# Patient Record
Sex: MALE | Race: WHITE | NOT HISPANIC OR LATINO | Employment: FULL TIME | URBAN - METROPOLITAN AREA
[De-identification: names, ages, dates, MRNs, and addresses within clinical notes are randomized per-mention and may not be internally consistent; named-entity substitution may affect disease eponyms.]

---

## 2017-09-13 ENCOUNTER — RECORDS - HEALTHEAST (OUTPATIENT)
Dept: LAB | Facility: CLINIC | Age: 57
End: 2017-09-13

## 2017-09-14 LAB
CHOLEST SERPL-MCNC: 219 MG/DL
FASTING STATUS PATIENT QL REPORTED: ABNORMAL
HDLC SERPL-MCNC: 45 MG/DL
LDLC SERPL CALC-MCNC: 127 MG/DL
PSA SERPL-MCNC: 0.5 NG/ML (ref 0–3.5)
TRIGL SERPL-MCNC: 236 MG/DL

## 2019-02-22 ENCOUNTER — TRANSFERRED RECORDS (OUTPATIENT)
Dept: HEALTH INFORMATION MANAGEMENT | Facility: CLINIC | Age: 59
End: 2019-02-22

## 2021-07-14 ENCOUNTER — LAB REQUISITION (OUTPATIENT)
Dept: LAB | Facility: CLINIC | Age: 61
End: 2021-07-14
Payer: COMMERCIAL

## 2021-07-14 DIAGNOSIS — Z12.5 ENCOUNTER FOR SCREENING FOR MALIGNANT NEOPLASM OF PROSTATE: ICD-10-CM

## 2021-07-14 DIAGNOSIS — E78.2 MIXED HYPERLIPIDEMIA: ICD-10-CM

## 2021-07-14 PROCEDURE — 80061 LIPID PANEL: CPT | Mod: ORL | Performed by: FAMILY MEDICINE

## 2021-07-14 PROCEDURE — G0103 PSA SCREENING: HCPCS | Mod: ORL | Performed by: FAMILY MEDICINE

## 2021-07-14 PROCEDURE — 80053 COMPREHEN METABOLIC PANEL: CPT | Mod: ORL | Performed by: FAMILY MEDICINE

## 2021-07-15 LAB
ALBUMIN SERPL-MCNC: 4 G/DL (ref 3.5–5)
ALP SERPL-CCNC: 73 U/L (ref 45–120)
ALT SERPL W P-5'-P-CCNC: 26 U/L (ref 0–45)
ANION GAP SERPL CALCULATED.3IONS-SCNC: 13 MMOL/L (ref 5–18)
AST SERPL W P-5'-P-CCNC: 24 U/L (ref 0–40)
BILIRUB SERPL-MCNC: 0.7 MG/DL (ref 0–1)
BUN SERPL-MCNC: 16 MG/DL (ref 8–22)
CALCIUM SERPL-MCNC: 9.1 MG/DL (ref 8.5–10.5)
CHLORIDE BLD-SCNC: 104 MMOL/L (ref 98–107)
CHOLEST SERPL-MCNC: 208 MG/DL
CO2 SERPL-SCNC: 23 MMOL/L (ref 22–31)
CREAT SERPL-MCNC: 0.88 MG/DL (ref 0.7–1.3)
FASTING STATUS PATIENT QL REPORTED: ABNORMAL
GFR SERPL CREATININE-BSD FRML MDRD: >90 ML/MIN/1.73M2
GLUCOSE BLD-MCNC: 102 MG/DL (ref 70–125)
HDLC SERPL-MCNC: 47 MG/DL
LDLC SERPL CALC-MCNC: 140 MG/DL
POTASSIUM BLD-SCNC: 4.2 MMOL/L (ref 3.5–5)
PROT SERPL-MCNC: 6.8 G/DL (ref 6–8)
PSA SERPL-MCNC: 0.52 UG/L (ref 0–4.5)
SODIUM SERPL-SCNC: 140 MMOL/L (ref 136–145)
TRIGL SERPL-MCNC: 103 MG/DL

## 2021-08-10 ENCOUNTER — LAB REQUISITION (OUTPATIENT)
Dept: LAB | Facility: CLINIC | Age: 61
End: 2021-08-10
Payer: COMMERCIAL

## 2021-08-10 DIAGNOSIS — I48.0 PAROXYSMAL ATRIAL FIBRILLATION (H): ICD-10-CM

## 2021-08-10 PROCEDURE — 84443 ASSAY THYROID STIM HORMONE: CPT | Performed by: FAMILY MEDICINE

## 2021-08-10 PROCEDURE — 36415 COLL VENOUS BLD VENIPUNCTURE: CPT | Performed by: FAMILY MEDICINE

## 2021-08-11 LAB — TSH SERPL DL<=0.005 MIU/L-ACNC: 1.79 UIU/ML (ref 0.3–5)

## 2022-03-16 ENCOUNTER — TRANSFERRED RECORDS (OUTPATIENT)
Dept: HEALTH INFORMATION MANAGEMENT | Facility: CLINIC | Age: 62
End: 2022-03-16

## 2022-07-18 ENCOUNTER — LAB REQUISITION (OUTPATIENT)
Dept: LAB | Facility: CLINIC | Age: 62
End: 2022-07-18
Payer: COMMERCIAL

## 2022-07-18 DIAGNOSIS — Z12.5 ENCOUNTER FOR SCREENING FOR MALIGNANT NEOPLASM OF PROSTATE: ICD-10-CM

## 2022-07-18 DIAGNOSIS — E78.2 MIXED HYPERLIPIDEMIA: ICD-10-CM

## 2022-07-18 PROCEDURE — 80061 LIPID PANEL: CPT | Mod: ORL | Performed by: FAMILY MEDICINE

## 2022-07-18 PROCEDURE — 80053 COMPREHEN METABOLIC PANEL: CPT | Mod: ORL | Performed by: FAMILY MEDICINE

## 2022-07-18 PROCEDURE — G0103 PSA SCREENING: HCPCS | Mod: ORL | Performed by: FAMILY MEDICINE

## 2022-07-19 LAB
ALBUMIN SERPL BCG-MCNC: 4.2 G/DL (ref 3.5–5.2)
ALP SERPL-CCNC: 76 U/L (ref 40–129)
ALT SERPL W P-5'-P-CCNC: 30 U/L (ref 10–50)
ANION GAP SERPL CALCULATED.3IONS-SCNC: 13 MMOL/L (ref 7–15)
AST SERPL W P-5'-P-CCNC: 32 U/L (ref 10–50)
BILIRUB SERPL-MCNC: 0.4 MG/DL
BUN SERPL-MCNC: 13.2 MG/DL (ref 8–23)
CALCIUM SERPL-MCNC: 8.8 MG/DL (ref 8.8–10.2)
CHLORIDE SERPL-SCNC: 100 MMOL/L (ref 98–107)
CHOLEST SERPL-MCNC: 228 MG/DL
CREAT SERPL-MCNC: 0.94 MG/DL (ref 0.67–1.17)
DEPRECATED HCO3 PLAS-SCNC: 24 MMOL/L (ref 22–29)
GFR SERPL CREATININE-BSD FRML MDRD: >90 ML/MIN/1.73M2
GLUCOSE SERPL-MCNC: 93 MG/DL (ref 70–99)
HDLC SERPL-MCNC: 40 MG/DL
LDLC SERPL CALC-MCNC: 134 MG/DL
NONHDLC SERPL-MCNC: 188 MG/DL
POTASSIUM SERPL-SCNC: 4.2 MMOL/L (ref 3.4–5.3)
PROT SERPL-MCNC: 6.7 G/DL (ref 6.4–8.3)
PSA SERPL-MCNC: 0.54 NG/ML (ref 0–4.5)
SODIUM SERPL-SCNC: 137 MMOL/L (ref 136–145)
TRIGL SERPL-MCNC: 270 MG/DL

## 2023-01-19 ENCOUNTER — TRANSFERRED RECORDS (OUTPATIENT)
Dept: HEALTH INFORMATION MANAGEMENT | Facility: CLINIC | Age: 63
End: 2023-01-19

## 2023-09-26 ENCOUNTER — OFFICE VISIT (OUTPATIENT)
Dept: FAMILY MEDICINE | Facility: CLINIC | Age: 63
End: 2023-09-26
Payer: COMMERCIAL

## 2023-09-26 VITALS
BODY MASS INDEX: 32.98 KG/M2 | WEIGHT: 257 LBS | HEIGHT: 74 IN | TEMPERATURE: 98.4 F | SYSTOLIC BLOOD PRESSURE: 109 MMHG | DIASTOLIC BLOOD PRESSURE: 71 MMHG | HEART RATE: 65 BPM | OXYGEN SATURATION: 96 % | RESPIRATION RATE: 16 BRPM

## 2023-09-26 DIAGNOSIS — Z13.0 SCREENING, ANEMIA, DEFICIENCY, IRON: ICD-10-CM

## 2023-09-26 DIAGNOSIS — Z12.5 SCREENING FOR PROSTATE CANCER: ICD-10-CM

## 2023-09-26 DIAGNOSIS — Z11.4 SCREENING FOR HIV WITHOUT PRESENCE OF RISK FACTORS: ICD-10-CM

## 2023-09-26 DIAGNOSIS — Z11.4 SCREENING FOR HIV (HUMAN IMMUNODEFICIENCY VIRUS): ICD-10-CM

## 2023-09-26 DIAGNOSIS — E66.811 CLASS 1 OBESITY WITH SERIOUS COMORBIDITY AND BODY MASS INDEX (BMI) OF 33.0 TO 33.9 IN ADULT, UNSPECIFIED OBESITY TYPE: ICD-10-CM

## 2023-09-26 DIAGNOSIS — G47.30 SLEEP APNEA, UNSPECIFIED TYPE: ICD-10-CM

## 2023-09-26 DIAGNOSIS — Z12.11 SCREEN FOR COLON CANCER: Primary | ICD-10-CM

## 2023-09-26 DIAGNOSIS — E78.5 HYPERLIPIDEMIA, UNSPECIFIED HYPERLIPIDEMIA TYPE: ICD-10-CM

## 2023-09-26 DIAGNOSIS — H91.90 DECREASED HEARING, UNSPECIFIED LATERALITY: ICD-10-CM

## 2023-09-26 DIAGNOSIS — R06.00 PND (PAROXYSMAL NOCTURNAL DYSPNEA): ICD-10-CM

## 2023-09-26 DIAGNOSIS — R13.10 DYSPHAGIA, UNSPECIFIED TYPE: ICD-10-CM

## 2023-09-26 DIAGNOSIS — Z00.00 ENCOUNTER FOR PREVENTIVE CARE: ICD-10-CM

## 2023-09-26 DIAGNOSIS — Z11.59 ENCOUNTER FOR HEPATITIS C SCREENING TEST FOR LOW RISK PATIENT: ICD-10-CM

## 2023-09-26 DIAGNOSIS — Z11.59 NEED FOR HEPATITIS C SCREENING TEST: ICD-10-CM

## 2023-09-26 DIAGNOSIS — Z13.228 SCREENING FOR METABOLIC DISORDER: ICD-10-CM

## 2023-09-26 DIAGNOSIS — R31.9 HEMATURIA, UNSPECIFIED TYPE: ICD-10-CM

## 2023-09-26 PROBLEM — I48.0 PAROXYSMAL ATRIAL FIBRILLATION (H): Status: ACTIVE | Noted: 2021-08-01

## 2023-09-26 PROBLEM — R29.818 SUSPECTED SLEEP APNEA: Status: ACTIVE | Noted: 2021-08-03

## 2023-09-26 PROCEDURE — 99214 OFFICE O/P EST MOD 30 MIN: CPT | Mod: 25 | Performed by: FAMILY MEDICINE

## 2023-09-26 PROCEDURE — 99386 PREV VISIT NEW AGE 40-64: CPT | Performed by: FAMILY MEDICINE

## 2023-09-26 RX ORDER — FLUTICASONE PROPIONATE 50 MCG
1 SPRAY, SUSPENSION (ML) NASAL
COMMUNITY
End: 2023-09-26

## 2023-09-26 RX ORDER — FLUTICASONE PROPIONATE 50 MCG
1 SPRAY, SUSPENSION (ML) NASAL DAILY
Qty: 16 G | Refills: 11 | Status: SHIPPED | OUTPATIENT
Start: 2023-09-26

## 2023-09-26 ASSESSMENT — ENCOUNTER SYMPTOMS
CONSTIPATION: 0
SHORTNESS OF BREATH: 0
NAUSEA: 0
NERVOUS/ANXIOUS: 0
DIARRHEA: 0
PALPITATIONS: 0
PARESTHESIAS: 0
MYALGIAS: 0
ARTHRALGIAS: 0
ABDOMINAL PAIN: 0
DIZZINESS: 0
HEADACHES: 0
EYE PAIN: 0
SORE THROAT: 0
CHILLS: 0
HEMATURIA: 1
COUGH: 0
FEVER: 0
DYSURIA: 0
JOINT SWELLING: 0
WEAKNESS: 0
HEMATOCHEZIA: 0
FREQUENCY: 0
HEARTBURN: 0

## 2023-09-26 NOTE — ASSESSMENT & PLAN NOTE
Obesity with bmi 33.45. with chronic comorbid condition including sleep apnea, afib, and hyperlipidemia, weight reduction strongly recommended. He declined physician assisted weight loss that was offered today.

## 2023-09-26 NOTE — ASSESSMENT & PLAN NOTE
Hyperlipidemia - he was told he is borderline with elevated lipids. Recommend recheck. He has been recommended to take statin, but he did coronary calcium score and score was zero and so he never took statin. Coronary calcium score was zero fall 2022.

## 2023-09-26 NOTE — ASSESSMENT & PLAN NOTE
Recommended vaccines - flu shot and zoster vaccine  Psa discussed he declined.   Colonoscopy: he says he did have colonoscopy in the past but he thinks it is not due for a few more years.  Will obtain records.   Std testing desired: offered  Osteoporosis prevention discussed.  vitamin d levels ordered. Recommend daily calcium and vitamin d intake to keep good bone health. Recommend weight bearing exercise, no tobacco, and limit alcohol  dexa - no indication.  Recommend sunscreen, exercise, & healthy diet.  Offered cbc, cmp, lipids and asked what other testing he  desires today  I have had an Advance Directives discussion with the patient.   Body mass index is 33.45 kg/m .   [facility] email sent.

## 2023-09-26 NOTE — ASSESSMENT & PLAN NOTE
Hx dysphagia, has had esophageal stretching 3 times. He says it was stretched most recently earlier in 2023, but he doesn't know exactly when, but it didn't help.     Solid dysphagia, gi referral placed. Also will try and obtain records from previous pcp.

## 2023-09-26 NOTE — PROGRESS NOTES
Flonase, dysphagia etc. addressed above and beyond usual scope of annual exam.     SUBJECTIVE:   CC: Bhavesh is an 62 year old who presents for preventative health visit.       9/26/2023     2:17 PM   Additional Questions   Roomed by ac   Accompanied by wife - Meron       Healthy Habits:     Getting at least 3 servings of Calcium per day:  Yes    Bi-annual eye exam:  Yes    Dental care twice a year:  Yes    Sleep apnea or symptoms of sleep apnea:  Daytime drowsiness and Sleep apnea    Diet:  Regular (no restrictions)    Frequency of exercise:  2-3 days/week    Duration of exercise:  30-45 minutes    Taking medications regularly:  Not Applicable    Medication side effects:  Not applicable    Additional concerns today:  No      Today's PHQ-2 Score:       9/26/2023     2:21 PM   PHQ-2 ( 1999 [employer])   Q1: Little interest or pleasure in doing things 0   Q2: Feeling down, depressed or hopeless 0   PHQ-2 Score 0   Q1: Little interest or pleasure in doing things Not at all   Q2: Feeling down, depressed or hopeless Not at all   PHQ-2 Score 0     Have you ever done Advance Care Planning? (For example, a Health Directive, POLST, or a discussion with a medical provider or your loved ones about your wishes): No, advance care planning information given to patient to review.  Patient plans to discuss their wishes with loved ones or provider.      Social History     Tobacco Use    Smoking status: Never    Smokeless tobacco: Never   Substance Use Topics    Alcohol use: Not on file           9/26/2023     2:18 PM   Alcohol Use   Prescreen: >3 drinks/day or >7 drinks/week? No       Last PSA:   Prostate Specific Antigen Screen   Date Value Ref Range Status   07/18/2022 0.54 0.00 - 4.50 ng/mL Final   07/14/2021 0.52 0.00 - 4.50 ug/L Final     Comment:     Blood specimen source: Peripheral Blood       Reviewed orders with patient. Reviewed health maintenance and updated orders accordingly - Yes      Reviewed and updated as needed this  "visit by clinical staff   Tobacco  Allergies  Meds  Problems  Med Hx  Surg Hx  Fam Hx  Soc   Hx        Reviewed and updated as needed this visit by Provider   Tobacco  Allergies  Meds  Problems  Med Hx  Surg Hx  Fam Hx  Soc   Hx           Review of Systems   Constitutional:  Negative for chills and fever.   HENT:  Positive for hearing loss. Negative for congestion, ear pain and sore throat.    Eyes:  Negative for pain and visual disturbance.   Respiratory:  Negative for cough and shortness of breath.    Cardiovascular:  Negative for chest pain, palpitations and peripheral edema.   Gastrointestinal:  Negative for abdominal pain, constipation, diarrhea, heartburn, hematochezia and nausea.   Genitourinary:  Positive for hematuria. Negative for dysuria, frequency, genital sores, impotence, penile discharge and urgency.   Musculoskeletal:  Negative for arthralgias, joint swelling and myalgias.   Skin:  Negative for rash.   Neurological:  Negative for dizziness, weakness, headaches and paresthesias.   Psychiatric/Behavioral:  Negative for mood changes. The patient is not nervous/anxious.          OBJECTIVE:   /71 (BP Location: Left arm, Patient Position: Sitting, Cuff Size: Adult Large)   Pulse 65   Temp 98.4  F (36.9  C) (Oral)   Resp 16   Ht 1.867 m (6' 1.5\")   Wt 116.6 kg (257 lb)   SpO2 96%   BMI 33.45 kg/m      Physical Exam  Constitutional:       Appearance: Normal appearance.   HENT:      Head: Normocephalic and atraumatic.      Right Ear: Tympanic membrane, ear canal and external ear normal.      Left Ear: There is impacted cerumen.      Mouth/Throat:      Mouth: Mucous membranes are moist.      Pharynx: Oropharynx is clear.   Eyes:      Conjunctiva/sclera: Conjunctivae normal.      Pupils: Pupils are equal, round, and reactive to light.   Cardiovascular:      Rate and Rhythm: Normal rate and regular rhythm.      Heart sounds: Normal heart sounds.   Pulmonary:      Effort: Pulmonary " effort is normal.      Breath sounds: Normal breath sounds.   Abdominal:      General: Bowel sounds are normal.      Palpations: Abdomen is soft.   Musculoskeletal:         General: Normal range of motion.      Cervical back: Normal range of motion and neck supple.   Neurological:      General: No focal deficit present.      Mental Status: He is alert.   Psychiatric:         Mood and Affect: Mood normal.         Behavior: Behavior normal.         Thought Content: Thought content normal.         Judgment: Judgment normal.               ASSESSMENT/PLAN:     Problem List Items Addressed This Visit          Nervous and Auditory    Diminished hearing     Diminished hearing noted on ros, audiology referral placed.          Relevant Orders    Adult Audiology  Referral       Respiratory    Sleep apnea     Sleep apnea diagnosed fall 2022 diagnosed as probable cause of afib which was discovered 2021. He was given a cpap which he couldn't sleep with, so now he is trying to get a dental appliance through an organization called [employer].     He doesn't want any intervention from me on this at this time.          PND (paroxysmal nocturnal dyspnea)     Post nasal drainage, flonase helps and he uses that every morning.          Relevant Medications    fluticasone (FLONASE) 50 MCG/ACT nasal spray       Digestive    Dysphagia, unspecified type     Hx dysphagia, has had esophageal stretching 3 times. He says it was stretched most recently earlier in 2023, but he doesn't know exactly when, but it didn't help.     Solid dysphagia, gi referral placed. Also will try and obtain records from previous pcp.          Relevant Orders    Adult GI  Referral - Consult Only    Class 1 obesity with serious comorbidity and body mass index (BMI) of 33.0 to 33.9 in adult     Obesity with bmi 33.45. with chronic comorbid condition including sleep apnea, afib, and hyperlipidemia, weight reduction strongly recommended. He declined  physician assisted weight loss that was offered today.             Endocrine    Hyperlipidemia     Hyperlipidemia - he was told he is borderline with elevated lipids. Recommend recheck. He has been recommended to take statin, but he did coronary calcium score and score was zero and so he never took statin. Coronary calcium score was zero fall 2022.          Relevant Orders    Lipid Profile       Other    Encounter for preventive care     Recommended vaccines - flu shot and zoster vaccine  Psa discussed he declined.   Colonoscopy: he says he did have colonoscopy in the past but he thinks it is not due for a few more years.  Will obtain records.   Std testing desired: offered  Osteoporosis prevention discussed.  vitamin d levels ordered. Recommend daily calcium and vitamin d intake to keep good bone health. Recommend weight bearing exercise, no tobacco, and limit alcohol  dexa - no indication.  Recommend sunscreen, exercise, & healthy diet.  Offered cbc, cmp, lipids and asked what other testing he  desires today  I have had an Advance Directives discussion with the patient.   Body mass index is 33.45 kg/m .   [facility] email sent.         Hematuria     Hematuria noted on ros, will check ua/ uc.         Relevant Orders    UA Macroscopic with reflex to Microscopic and Culture - Lab Collect    Urine Culture Aerobic Bacterial - lab collect     Other Visit Diagnoses       Screen for colon cancer    -  Primary    Screening for HIV (human immunodeficiency virus)        Relevant Orders    HIV Antigen Antibody Combo    Need for hepatitis C screening test        Relevant Orders    Hepatitis C Screen Reflex to HCV RNA Quant and Genotype    Screening for prostate cancer        Relevant Orders    PSA, screen    Screening, anemia, deficiency, iron        Relevant Orders    CBC with platelets    Screening for metabolic disorder        Relevant Orders    Comprehensive metabolic panel (BMP + Alb, Alk Phos, ALT, AST, Total. Bili, TP)     "Screening for HIV without presence of risk factors        Relevant Orders    HIV Antigen Antibody Combo    Encounter for hepatitis C screening test for low risk patient        Relevant Orders    Hepatitis C Screen Reflex to HCV RNA Quant and Genotype            Patient has been advised of split billing requirements and indicates understanding: Yes      COUNSELING:   Reviewed preventive health counseling, as reflected in patient instructions       Regular exercise       Healthy diet/nutrition       Safe sex practices/STD prevention       Colorectal cancer screening      BMI:   Estimated body mass index is 33.45 kg/m  as calculated from the following:    Height as of this encounter: 1.867 m (6' 1.5\").    Weight as of this encounter: 116.6 kg (257 lb).   Weight management plan: Discussed healthy diet and exercise guidelines      He reports that he has never smoked. He has never used smokeless tobacco.            Wendy Banks MD  [facility]  "

## 2023-09-26 NOTE — ASSESSMENT & PLAN NOTE
Sleep apnea diagnosed fall 2022 diagnosed as probable cause of afib which was discovered 2021. He was given a cpap which he couldn't sleep with, so now he is trying to get a dental appliance through an organization called [employer].     He doesn't want any intervention from me on this at this time.

## 2023-09-27 ENCOUNTER — DOCUMENTATION ONLY (OUTPATIENT)
Dept: GASTROENTEROLOGY | Facility: CLINIC | Age: 63
End: 2023-09-27
Payer: COMMERCIAL

## 2023-09-27 NOTE — PROGRESS NOTES
Faxed request to [facility]: please send medical records pertaining to recent referral.      Clinic Information:  [facility]  Phone #: [phone] extension [phone] sk

## 2023-09-29 NOTE — PROGRESS NOTES
Called [facility] to follow-up on recent faxed request for records on 9-28-23. [facility] representative denied receipt and confirmed fax number. Re-faxed records request.    Call today (9-29-23) and confirmed receipt, was informed that records would be sent in a timely manner.     Clinic Information:  [facility]  Phone #: [phone] extension [phone] sk

## 2023-12-01 ENCOUNTER — LAB (OUTPATIENT)
Dept: LAB | Facility: CLINIC | Age: 63
End: 2023-12-01
Payer: COMMERCIAL

## 2023-12-01 ENCOUNTER — ALLIED HEALTH/NURSE VISIT (OUTPATIENT)
Dept: FAMILY MEDICINE | Facility: CLINIC | Age: 63
End: 2023-12-01
Payer: COMMERCIAL

## 2023-12-01 DIAGNOSIS — R31.9 HEMATURIA, UNSPECIFIED TYPE: ICD-10-CM

## 2023-12-01 DIAGNOSIS — Z11.59 ENCOUNTER FOR HEPATITIS C SCREENING TEST FOR LOW RISK PATIENT: ICD-10-CM

## 2023-12-01 DIAGNOSIS — Z11.59 NEED FOR HEPATITIS C SCREENING TEST: ICD-10-CM

## 2023-12-01 DIAGNOSIS — E78.5 HYPERLIPIDEMIA, UNSPECIFIED HYPERLIPIDEMIA TYPE: ICD-10-CM

## 2023-12-01 DIAGNOSIS — Z13.0 SCREENING, ANEMIA, DEFICIENCY, IRON: ICD-10-CM

## 2023-12-01 DIAGNOSIS — Z11.4 SCREENING FOR HIV (HUMAN IMMUNODEFICIENCY VIRUS): ICD-10-CM

## 2023-12-01 DIAGNOSIS — Z13.228 SCREENING FOR METABOLIC DISORDER: ICD-10-CM

## 2023-12-01 DIAGNOSIS — Z12.5 SCREENING FOR PROSTATE CANCER: ICD-10-CM

## 2023-12-01 DIAGNOSIS — Z11.4 SCREENING FOR HIV WITHOUT PRESENCE OF RISK FACTORS: ICD-10-CM

## 2023-12-01 DIAGNOSIS — Z23 NEED FOR VACCINATION: Primary | ICD-10-CM

## 2023-12-01 LAB
ALBUMIN SERPL BCG-MCNC: 4.2 G/DL (ref 3.5–5.2)
ALBUMIN UR-MCNC: NEGATIVE MG/DL
ALP SERPL-CCNC: 80 U/L (ref 40–150)
ALT SERPL W P-5'-P-CCNC: 32 U/L (ref 0–70)
ANION GAP SERPL CALCULATED.3IONS-SCNC: 10 MMOL/L (ref 7–15)
APPEARANCE UR: CLEAR
AST SERPL W P-5'-P-CCNC: 31 U/L (ref 0–45)
BILIRUB SERPL-MCNC: 0.6 MG/DL
BILIRUB UR QL STRIP: NEGATIVE
BUN SERPL-MCNC: 15.7 MG/DL (ref 8–23)
CALCIUM SERPL-MCNC: 9 MG/DL (ref 8.8–10.2)
CHLORIDE SERPL-SCNC: 102 MMOL/L (ref 98–107)
COLOR UR AUTO: YELLOW
CREAT SERPL-MCNC: 0.99 MG/DL (ref 0.67–1.17)
DEPRECATED HCO3 PLAS-SCNC: 27 MMOL/L (ref 22–29)
EGFRCR SERPLBLD CKD-EPI 2021: 86 ML/MIN/1.73M2
ERYTHROCYTE [DISTWIDTH] IN BLOOD BY AUTOMATED COUNT: 12.4 % (ref 10–15)
GLUCOSE SERPL-MCNC: 97 MG/DL (ref 70–99)
GLUCOSE UR STRIP-MCNC: NEGATIVE MG/DL
HCT VFR BLD AUTO: 45.3 % (ref 40–53)
HGB BLD-MCNC: 15.4 G/DL (ref 13.3–17.7)
HGB UR QL STRIP: ABNORMAL
KETONES UR STRIP-MCNC: NEGATIVE MG/DL
LEUKOCYTE ESTERASE UR QL STRIP: NEGATIVE
MCH RBC QN AUTO: 30.1 PG (ref 26.5–33)
MCHC RBC AUTO-ENTMCNC: 34 G/DL (ref 31.5–36.5)
MCV RBC AUTO: 89 FL (ref 78–100)
NITRATE UR QL: NEGATIVE
PH UR STRIP: 5.5 [PH] (ref 5–8)
PLATELET # BLD AUTO: 222 10E3/UL (ref 150–450)
POTASSIUM SERPL-SCNC: 4.3 MMOL/L (ref 3.4–5.3)
PROT SERPL-MCNC: 7.2 G/DL (ref 6.4–8.3)
RBC # BLD AUTO: 5.12 10E6/UL (ref 4.4–5.9)
RBC #/AREA URNS AUTO: NORMAL /HPF
SODIUM SERPL-SCNC: 139 MMOL/L (ref 135–145)
SP GR UR STRIP: 1.02 (ref 1–1.03)
UROBILINOGEN UR STRIP-ACNC: 0.2 E.U./DL
WBC # BLD AUTO: 4.4 10E3/UL (ref 4–11)
WBC #/AREA URNS AUTO: NORMAL /HPF

## 2023-12-01 PROCEDURE — G0103 PSA SCREENING: HCPCS

## 2023-12-01 PROCEDURE — 81001 URINALYSIS AUTO W/SCOPE: CPT

## 2023-12-01 PROCEDURE — 90686 IIV4 VACC NO PRSV 0.5 ML IM: CPT

## 2023-12-01 PROCEDURE — 36415 COLL VENOUS BLD VENIPUNCTURE: CPT

## 2023-12-01 PROCEDURE — 86803 HEPATITIS C AB TEST: CPT

## 2023-12-01 PROCEDURE — 90471 IMMUNIZATION ADMIN: CPT

## 2023-12-01 PROCEDURE — 87086 URINE CULTURE/COLONY COUNT: CPT

## 2023-12-01 PROCEDURE — 87389 HIV-1 AG W/HIV-1&-2 AB AG IA: CPT

## 2023-12-01 PROCEDURE — 85027 COMPLETE CBC AUTOMATED: CPT

## 2023-12-01 PROCEDURE — 90480 ADMN SARSCOV2 VAC 1/ONLY CMP: CPT

## 2023-12-01 PROCEDURE — 91320 SARSCV2 VAC 30MCG TRS-SUC IM: CPT

## 2023-12-01 PROCEDURE — 80061 LIPID PANEL: CPT

## 2023-12-01 PROCEDURE — 99207 PR NO CHARGE NURSE ONLY: CPT

## 2023-12-01 PROCEDURE — 80053 COMPREHEN METABOLIC PANEL: CPT

## 2023-12-02 LAB
BACTERIA UR CULT: NO GROWTH
CHOLEST SERPL-MCNC: 218 MG/DL
HCV AB SERPL QL IA: NONREACTIVE
HDLC SERPL-MCNC: 48 MG/DL
HIV 1+2 AB+HIV1 P24 AG SERPL QL IA: NONREACTIVE
LDLC SERPL CALC-MCNC: 152 MG/DL
NONHDLC SERPL-MCNC: 170 MG/DL
PSA SERPL DL<=0.01 NG/ML-MCNC: 0.52 NG/ML (ref 0–4.5)
TRIGL SERPL-MCNC: 92 MG/DL

## 2023-12-12 ENCOUNTER — OFFICE VISIT (OUTPATIENT)
Dept: AUDIOLOGY | Facility: CLINIC | Age: 63
End: 2023-12-12
Payer: COMMERCIAL

## 2023-12-12 DIAGNOSIS — H90.A21 SENSORINEURAL HEARING LOSS (SNHL) OF RIGHT EAR WITH RESTRICTED HEARING OF LEFT EAR: Primary | ICD-10-CM

## 2023-12-12 DIAGNOSIS — H93.13 TINNITUS OF BOTH EARS: ICD-10-CM

## 2023-12-12 PROCEDURE — 92550 TYMPANOMETRY & REFLEX THRESH: CPT | Mod: 52 | Performed by: AUDIOLOGIST

## 2023-12-12 PROCEDURE — 92557 COMPREHENSIVE HEARING TEST: CPT | Performed by: AUDIOLOGIST

## 2023-12-12 NOTE — PROGRESS NOTES
AUDIOLOGY REPORT    SUBJECTIVE:  Bhavesh Davis is a 63 year old male who was seen in the Audiology Clinic at the [facility] for audiologic evaluation, referred by Wendy Banks M.D. The patient reported decreased hearing ability for conversations with family members, and especially with his grandchildren. He endorsed bilateral, constant tinnitus, which increases in volume occasionally, but is not pulsatile or debilitating. 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 No hearing protection has been used. He denied vertigo, otalgia, otorrhea, recent illness, or aural fullness.     OBJECTIVE:  Abuse Screening:  Do you feel unsafe at home or work/school? No  Do you feel threatened by someone? No  Does anyone try to keep you from having contact with others, or doing things outside of your home? No  Physical signs of abuse present? No     Fall Risk Screen:  1. Have you fallen two or more times in the past year? No  2. Have you fallen and had an injury in the past year? No    Is patient a fall risk? No  Referral initiated: No  Fall Risk Assessment Completed by Audiology    Otoscopic exam indicates ears are clear of cerumen, bilaterally.     Pure Tone Thresholds assessed using conventional audiometry with good  reliability from 250-8000 Hz bilaterally using insert earphones and circumaural headphones.     RIGHT:  Normal hearing sensitivity for 250-2000 Hz, steeply sloping to severe to profound sensorineural hearing loss for 0615-8833 Hz.    LEFT:    Normal hearing sensitivity for 250-2000 Hz, steeply sloping to moderate severe/severe sensorineural hearing loss for 9604-2487 Hz.    Tympanogram:    RIGHT: normal eardrum mobility    LEFT:   normal eardrum mobility    Reflexes for 1000 Hz (reported by stimulus ear):  RIGHT: Ipsilateral was unable to be tested (could not maintain  seal)  RIGHT: Contralateral is present at normal levels  LEFT:   Ipsilateral is present at normal levels  LEFT:   Contralateral was unable to be tested (could not maintain seal)      Speech Reception Threshold:    RIGHT: 15 dB HL    LEFT:   20 dB HL  Word Recognition Score:     RIGHT: 100% at 55 dB HL using NU-6 recorded word list.    LEFT:   100% at 55 dB HL using NU-6 recorded word list.      ASSESSMENT:     ICD-10-CM    1. Sensorineural hearing loss (SNHL) of right ear with restricted hearing of left ear  H90.A21 Adult Audiology  Referral      2. Tinnitus of both ears  H93.13           Today s results were discussed with the patient in detail. Appropriate communication strategies were discussed at length, as were reasonable expectations regarding hearing at a distance, in noisy environments, or when attention is diverted elsewhere.    PLAN:  Patient was counseled regarding hearing loss and impact on communication.  Patient is a good candidate for binaural amplification at this time. Mr. Davis was encouraged to check on his amplification benefits with his health insurance carrier, to determine his financial responsibility as well as where those benefits may be used. Separately, custom hearing protection for use with firearms/impulse noise with a passive filter was discussed for use while hunting to preserve the viable hearing remaining, and to prevent worsening of his tinnitus. He was provided with a copy of today's audiogram, and information on the [facility] hearing aid program.    Mr. Davis should return annually for retesting to monitor current hearing thresholds. Wear hearing protection consistently in noise to preserve residual hearing sensitivity and to minimize the effects of noise on tinnitus. Mr. Davis expressed verbal understanding of this information and plan. Please call this clinic with questions regarding these results or recommendations.        Timoteo Suarez,  CCC-A  [place] Licensed Audiologist [id number]

## 2024-02-01 ENCOUNTER — TELEPHONE (OUTPATIENT)
Dept: GASTROENTEROLOGY | Facility: CLINIC | Age: 64
End: 2024-02-01
Payer: COMMERCIAL

## 2024-02-01 NOTE — TELEPHONE ENCOUNTER
Called to remind patient of their upcoming appointment with our GI clinic, on Mond Feb 5th at 3:40pm with Dr. Rick Sanz. This appointment is scheduled as an in-person appt. Please arrive 15 minutes early to check in for your appointment. , if your appointment is virtual (video or telephone) you need to be in [place] for the visit. To reschedule or cancel patient to call [phone].    Janice Almodovar

## 2024-02-02 NOTE — TELEPHONE ENCOUNTER
REFERRAL INFORMATION:  Referring Provider:  Wendy Banks MD  Referring Clinic:  [facility]  Reason for Visit/Diagnosis: R13.10 (ICD-10-CM) - Dysphagia, unspecified type     FUTURE VISIT INFORMATION:  Appointment Date: 2/5/24  Appointment Time: 3:40 PM      NOTES STATUS DETAILS   OFFICE NOTE from Referring Provider Internal 9/26/23 - PCC OV with Wendy Banks MD    OFFICE NOTE from Other Specialist Received 10/19/22 - PCC OV with Brock Hart MD at [facility]     1/20/23 - GI result note with Aditya Smith   3/16/22 - GI result note with Anthony Angeles DO at [facility]     2/25/19 - GI result note with Chinedu David MD at [facility]    MEDICATION LIST Internal         ENDOSCOPY  Received [facility]:  1/19/23 - EGD   COLONOSCOPY Received [facility]:  3/16/22, 2/22/19   PERTINENT LABS Internal 12/1/23 - CMP; CBC/diff   PATHOLOGY REPORTS (RELATED) Received 1/19/23 - EGD bx   3/16/22, 2/22/19 - colon bx

## 2024-02-05 ENCOUNTER — OFFICE VISIT (OUTPATIENT)
Dept: GASTROENTEROLOGY | Facility: CLINIC | Age: 64
End: 2024-02-05
Payer: COMMERCIAL

## 2024-02-05 ENCOUNTER — PRE VISIT (OUTPATIENT)
Dept: GASTROENTEROLOGY | Facility: CLINIC | Age: 64
End: 2024-02-05

## 2024-02-05 VITALS
WEIGHT: 269.1 LBS | DIASTOLIC BLOOD PRESSURE: 67 MMHG | HEART RATE: 63 BPM | HEIGHT: 75 IN | SYSTOLIC BLOOD PRESSURE: 112 MMHG | OXYGEN SATURATION: 96 % | BODY MASS INDEX: 33.46 KG/M2

## 2024-02-05 DIAGNOSIS — R13.19 ESOPHAGEAL DYSPHAGIA: Primary | ICD-10-CM

## 2024-02-05 PROCEDURE — 99204 OFFICE O/P NEW MOD 45 MIN: CPT | Performed by: INTERNAL MEDICINE

## 2024-02-05 ASSESSMENT — PAIN SCALES - GENERAL: PAINLEVEL: NO PAIN (0)

## 2024-02-05 NOTE — PATIENT INSTRUCTIONS
It was a pleasure taking care of you today.  I've included a brief summary of our discussion and care plan from today's visit below.  Please review this information with your primary care provider.  _______________________________________________________________________    My recommendations are summarized as follows:    Please schedule an upper endoscopy with likely dilation  Depending on the findings future testing could include high resolution esophageal manometry, pH testing (reflux), barium esophagram    To schedule endoscopic procedures you may call: [phone]  To schedule radiology tests you may call: [phone]  To schedule an ENT appointment you may call: [phone]    Please call my nurse care coordinator Kathia ([phone]) or Henna ([phone]), with any questions or concerns.  If you were seen through the [facility] please feel free to reach out to Germaine at [phone]   --    Return to GI Clinic in 6 months to review your progress.    _______________________________________________________________________    Who do I call with any questions after my visit?  Please be in touch if there are any further questions that arise following today's visit.  There are multiple ways to contact your gastroenterology care team.      During business hours, you may reach a Gastroenterology nurse at [phone] and choose option 3.       To schedule or reschedule an appointment, please call [phone].     You can always send a secure message through [employer].  [employer] messages are answered by your nurse or doctor typically within 24 hours.  Please allow extra time on weekends and holidays.      For urgent/emergent questions after business hours, you may reach the on-call GI Fellow by contacting the [facility] at [phone].     How will I get the results of any tests ordered?    You will receive all of your results.  If you have signed up for MyChart, any tests ordered at your  visit will be available to you after your physician reviews them.  Typically this takes 1-2 weeks.  If there are urgent results that require a change in your care plan, your physician or nurse will call you to discuss the next steps.      What is [employer]?  MiFi is a secure way for you to access all of your healthcare records from the [facility].  It is a web based computer program, so you can sign on to it from any location.  It also allows you to send secure messages to your care team.  I recommend signing up for MiFi access if you have not already done so and are comfortable with using a computer.      How to I schedule a follow-up visit?  If you did not schedule a follow-up visit today, please call [phone] to schedule a follow-up office visit.      If you feel you received exceptional care and are interested in supporting the clinical and research goals of Dr. Sanz or the Division of Gastroenterology, Hepatology, and Nutrition please contact him directly through Mi[employer]  to discuss opportunities to donate.    Sincerely,    Rick Sanz DO   of Medicine  Director, Esophageal Disorders Program  Division of Gastroenterology, Hepatology, and Nutrition  [facility]

## 2024-02-05 NOTE — LETTER
"    2/5/2024         RE: Bhavesh Davis  [address]  [facility] [address]        Dear Colleague,    Thank you for referring your patient, Bhavesh Davis, to the [facility] [place]. Please see a copy of my visit note below.    Chief Complaint   Patient presents with    New Patient       Vitals:    02/05/24 1533   BP: 112/67   BP Location: Left arm   Patient Position: Sitting   Cuff Size: Adult Regular   Pulse: 63   SpO2: 96%   Weight: 122.1 kg (269 lb 1.6 oz)   Height: 1.892 m (6' 2.5\")       Body mass index is 34.09 kg/m .    [facility]    Gastroenterology Visit for: Bhavesh Davis 1960   MRN: [id number]     Reason for Visit:  chief complaint    Referred by: Darren  / Tawny [address] / [facility] [address]  Patient Care Team:  Wendy Banks MD as PCP - General (Family Medicine)  Wendy Banks MD as Assigned PCP  Jaz Scherer AuD as Audiologist (Audiology)  Rick Sanz DO as Physician (Gastroenterology)    History of Present Illness:   Bhavesh Davis is a 63 year old male with HLD, hearing loss, afib, obesity who is presenting as a new patient in consultation at the request of Dr. Banks with a chief complaint of dysphagia.  ---------------------------------------------------------------  Bhavesh Davis states he has had esophageal dilations x3 and feels he has recurrence of dysphagia 3 weeks following his last dilation. First time having dysphagia and dilation was approximately 15 years ago. First dilation helped for about 10 years before he had symptoms of dysphagia requiring a repeat scope/dilation. Dysphagia is intermittent. Rice, meat, bread can all get stuck mao  gulp of water can push it down. He has had a few times of regurgitation because it would not go down. Reflux only if eating late in the evening.  ---------------------------------------------------------------     Wt Readings from Last 5 Encounters:   02/05/24 122.1 kg (269 lb " 1.6 oz)   09/26/23 116.6 kg (257 lb)        Esophageal Questionnaire(s)    BEDQ Questionnaire       No data to display                   No data to display                Eckardt Questionnaire       No data to display                Promis 10 Questionnaire       No data to display                    STUDIES & PROCEDURES:    EGD:   Date:  1/19/23  Impression:    Pathology Report:    Colonoscopy:  Date: 3/16/22  Impression: polyps, repeat colonoscopy in 5 years  Pathology Report: 1 hyperplastic polyp, 1 tubular adenoma     EndoFLIP directed at the UES or LES (8cm (EF-325) balloon length or 16cm (EF-322) balloon length):   Date:  8cm balloon  Balloon inflation Balloon pressure CSA (mm^2) DI (mm^2/mmHg) Dmin (mm) Compliance   20 (ladmark ID)        30        40        50           16cm balloon  Balloon inflation Balloon pressure CSA (mm^2) DI (mm^2/mmHg) Dmin (mm) Compliance   30 (ladmark ID)        40        50        60        70           High Resolution Manometry:  Date:  Impression:    PH/Impedance:  Date:  Impression:     Bravo:  48 or 96hr  Date:  Impression:    CT:  Date:  Impression:    Esophagram:  Date:  Impression:     Prior medical records were reviewed including, but not limited to, notes from referring providers, lab work, radiographic tests, and other diagnostic tests. Pertinent results were summarized above.     History   No past medical history on file.    Past Surgical History:   Procedure Laterality Date    CARPAL TUNNEL RELEASE RT/LT Bilateral        Social History     Socioeconomic History    Marital status:      Spouse name: Not on file    Number of children: Not on file    Years of education: Not on file    Highest education level: Not on file   Occupational History    Not on file   Tobacco Use    Smoking status: Never     Passive exposure: Past    Smokeless tobacco: Never   Vaping Use    Vaping Use: Never used   Substance and Sexual Activity    Alcohol use: Yes     Alcohol/week: 0.0 - 1.0  standard drinks of alcohol     Comment: Very rare - once per month    Drug use: Never    Sexual activity: Not on file   Other Topics Concern    Not on file   Social History Narrative    9/26/2023 lives with wife Meron, he is an . For fun he likes gardening and hunting, pontooning, likes to do outdoor projects. He likes construction.      Social Determinants of Health     Financial Resource Strain: Low Risk  (9/26/2023)    Financial Resource Strain     Within the past 12 months, have you or your family members you live with been unable to get utilities (heat, electricity) when it was really needed?: No   Food Insecurity: Low Risk  (9/26/2023)    Food Insecurity     Within the past 12 months, did you worry that your food would run out before you got money to buy more?: No     Within the past 12 months, did the food you bought just not last and you didn t have money to get more?: No   Transportation Needs: Low Risk  (9/26/2023)    Transportation Needs     Within the past 12 months, has lack of transportation kept you from medical appointments, getting your medicines, non-medical meetings or appointments, work, or from getting things that you need?: No   Physical Activity: Not on file   Stress: Not on file   Social Connections: Not on file   Interpersonal Safety: Not on file   Housing Stability: Low Risk  (9/26/2023)    Housing Stability     Do you have housing? : Yes     Are you worried about losing your housing?: No       No family history on file.  Family history reviewed and edited as appropriate    Medications and Allergies:     Outpatient Encounter Medications as of 2/5/2024   Medication Sig Dispense Refill    fluticasone (FLONASE) 50 MCG/ACT nasal spray Spray 1 spray into both nostrils daily 16 g 11     No facility-administered encounter medications on file as of 2/5/2024.        No Known Allergies     Review of systems:  A full 10 point review of systems was obtained and was negative except for the  "pertinent positives and negatives stated within the HPI.    Objective Findings:   Physical Exam:    Constitutional: /67 (BP Location: Left arm, Patient Position: Sitting, Cuff Size: Adult Regular)   Pulse 63   Ht 1.892 m (6' 2.5\")   Wt 122.1 kg (269 lb 1.6 oz)   SpO2 96%   BMI 34.09 kg/m    General: Alert, cooperative, no distress, well-appearing  Head: Atraumatic, normocephalic, no obvious abnormalities   Eyes: EOMI, Sclera anicteric, no obvious conjunctival hemorrhage   Nose: Nares normal, no obvious malformation, no obvious rhinorrhea   Respiratory: normal appearing respirations, no cough  Musculoskeletal: Range of motion intact, no obvious strength deficit  Skin: No jaundice, no obvious rash  Neurologic: AAOx3, no obvious neurologic abnormality  Psychiatric: Normal Affect, appropriate mood  Extremities: No obvious edema, no obvious malformation     Labs, Radiology, Pathology     Lab Results   Component Value Date    WBC 4.4 12/01/2023    HGB 15.4 12/01/2023     12/01/2023    CHOL 218 (H) 12/01/2023    CHOL 228 (H) 07/18/2022    CHOL 208 (H) 07/14/2021    TRIG 92 12/01/2023    TRIG 270 (H) 07/18/2022    TRIG 103 07/14/2021    HDL 48 12/01/2023    HDL 40 07/18/2022    HDL 47 07/14/2021    ALT 32 12/01/2023    ALT 30 07/18/2022    ALT 26 07/14/2021    AST 31 12/01/2023    AST 32 07/18/2022    AST 24 07/14/2021     12/01/2023     07/18/2022     07/14/2021    BUN 15.7 12/01/2023    BUN 13.2 07/18/2022    BUN 16 07/14/2021    CO2 27 12/01/2023    CO2 24 07/18/2022    CO2 23 07/14/2021    TSH 1.79 08/10/2021      Liver Function Studies -   Recent Labs   Lab Test 12/01/23  0749   PROTTOTAL 7.2   ALBUMIN 4.2   BILITOTAL 0.6   ALKPHOS 80   AST 31   ALT 32      Patient Active Problem List    Diagnosis Date Noted    Sensorineural hearing loss (SNHL) of right ear with restricted hearing of left ear 12/12/2023     Priority: Medium    Dysphagia, unspecified type 09/26/2023     Priority: " Medium    PND (paroxysmal nocturnal dyspnea) 09/26/2023     Priority: Medium    Class 1 obesity with serious comorbidity and body mass index (BMI) of 33.0 to 33.9 in adult 09/26/2023     Priority: Medium    Encounter for preventive care 09/26/2023     Priority: Medium    Hematuria 09/26/2023     Priority: Medium    Diminished hearing 09/26/2023     Priority: Medium    Hyperlipidemia 08/03/2021     Priority: Medium    Sleep apnea 08/03/2021     Priority: Medium    Paroxysmal atrial fibrillation (H) 08/01/2021     Priority: Medium      Assessment and Plan   Assessment:    Bhavesh Davis is a 63 year old male with HLD, hearing loss, afib, obesity who is presenting as a new patient in consultation at the request of Dr. Banks with a chief complaint of dysphagia.    The patient was seen regarding his chronic dysphagia that has resolved with dilations in the past of a schatzki's ring. He did not have a durable result after his last endoscopy. We discussed a repeat endoscopy with dilation. If this is unsuccessful at improving symptoms we will consider pursuing additional testing such as HRM, pH, barium. No plans to initiate medication at this time. If there is erosive esophagitis on exam, then we will begin PPI therapy.     1. Dysphagia, unspecified type       No orders of the defined types were placed in this encounter.     Plan:  Please schedule an upper endoscopy with likely dilation  Depending on the findings future testing could include high resolution esophageal manometry, pH testing (reflux), barium esophagram    Follow up plan:   Return to clinic 6 months and as needed.    The risks and benefits of my recommendations, as well as other treatment options were discussed with the patient and any available family today. All questions were answered.     Follow up: As planned above. Today, I personally spent 25 minutes in direct face to face time with the patient, of which greater than 50% of the time was spent in  patient education and counseling as described above. Approximately 10 minutes were spent on indirect care associated with the patient's consultation including but not limited to review of: patient medical records to date, clinic visits, hospital records, lab results, imaging studies, procedural documentation, and coordinating care with other providers. The findings from this review are summarized in the above note. All of the above accounted for a cumulative time of 35 minutes and was performed on the date of service.     The patient verbalized understanding of the plan and was appreciative for the time spent and information provided during the office visit.     Author:   Rick Sanz DO   of Medicine  Director, Esophageal Disorders Program  Division of Gastroenterology, Hepatology, and Nutrition  [facility]    Dr. Sanz speaks for [employer] regarding dupilumab and has participated in advisory boards for the medication. He receives income for these activities. When discussing the medication, patients were informed of Dr. Sanz's role and potential conflict of interest. All questions and/or concerns were answered during the encounter.     Documentation assisted by voice recognition and documentation system.      Again, thank you for allowing me to participate in the care of your patient.      Sincerely,    Rick Sanz DO

## 2024-02-05 NOTE — PROGRESS NOTES
"Chief Complaint   Patient presents with    New Patient       Vitals:    02/05/24 1533   BP: 112/67   BP Location: Left arm   Patient Position: Sitting   Cuff Size: Adult Regular   Pulse: 63   SpO2: 96%   Weight: 122.1 kg (269 lb 1.6 oz)   Height: 1.892 m (6' 2.5\")       Body mass index is 34.09 kg/m .    [facility]    Gastroenterology Visit for: Bhavesh Davis 1960   MRN: [id number]     Reason for Visit:  chief complaint    Referred by: Darren  / Tawny [address] / [facility] [address]  Patient Care Team:  Wendy Banks MD as PCP - General (Family Medicine)  Wendy Banks MD as Assigned PCP  Jaz Scherer AuD as Audiologist (Audiology)  Rick Sanz DO as Physician (Gastroenterology)    History of Present Illness:   Bhavesh Davis is a 63 year old male with HLD, hearing loss, afib, obesity who is presenting as a new patient in consultation at the request of Dr. Banks with a chief complaint of dysphagia.  ---------------------------------------------------------------  Bhavesh Davis states he has had esophageal dilations x3 and feels he has recurrence of dysphagia 3 weeks following his last dilation. First time having dysphagia and dilation was approximately 15 years ago. First dilation helped for about 10 years before he had symptoms of dysphagia requiring a repeat scope/dilation. Dysphagia is intermittent. Rice, meat, bread can all get stuck mao  gulp of water can push it down. He has had a few times of regurgitation because it would not go down. Reflux only if eating late in the evening.  ---------------------------------------------------------------     Wt Readings from Last 5 Encounters:   02/05/24 122.1 kg (269 lb 1.6 oz)   09/26/23 116.6 kg (257 lb)        Esophageal Questionnaire(s)    BEDQ Questionnaire       No data to display                   No data to display                Eckardt Questionnaire       No data to display                Promis 10 Questionnaire       No data " to display                    STUDIES & PROCEDURES:    EGD:   Date:  1/19/23  Impression:    Pathology Report:    Colonoscopy:  Date: 3/16/22  Impression: polyps, repeat colonoscopy in 5 years  Pathology Report: 1 hyperplastic polyp, 1 tubular adenoma     EndoFLIP directed at the UES or LES (8cm (EF-325) balloon length or 16cm (EF-322) balloon length):   Date:  8cm balloon  Balloon inflation Balloon pressure CSA (mm^2) DI (mm^2/mmHg) Dmin (mm) Compliance   20 (ladmark ID)        30        40        50           16cm balloon  Balloon inflation Balloon pressure CSA (mm^2) DI (mm^2/mmHg) Dmin (mm) Compliance   30 (ladmark ID)        40        50        60        70           High Resolution Manometry:  Date:  Impression:    PH/Impedance:  Date:  Impression:     Bravo:  48 or 96hr  Date:  Impression:    CT:  Date:  Impression:    Esophagram:  Date:  Impression:     Prior medical records were reviewed including, but not limited to, notes from referring providers, lab work, radiographic tests, and other diagnostic tests. Pertinent results were summarized above.     History   No past medical history on file.    Past Surgical History:   Procedure Laterality Date    CARPAL TUNNEL RELEASE RT/LT Bilateral        Social History     Socioeconomic History    Marital status:      Spouse name: Not on file    Number of children: Not on file    Years of education: Not on file    Highest education level: Not on file   Occupational History    Not on file   Tobacco Use    Smoking status: Never     Passive exposure: Past    Smokeless tobacco: Never   Vaping Use    Vaping Use: Never used   Substance and Sexual Activity    Alcohol use: Yes     Alcohol/week: 0.0 - 1.0 standard drinks of alcohol     Comment: Very rare - once per month    Drug use: Never    Sexual activity: Not on file   Other Topics Concern    Not on file   Social History Narrative    9/26/2023 lives with wife Meron, he is an . For fun he likes gardening  "and hunting, pontooning, likes to do outdoor projects. He likes construction.      Social Determinants of Health     Financial Resource Strain: Low Risk  (9/26/2023)    Financial Resource Strain     Within the past 12 months, have you or your family members you live with been unable to get utilities (heat, electricity) when it was really needed?: No   Food Insecurity: Low Risk  (9/26/2023)    Food Insecurity     Within the past 12 months, did you worry that your food would run out before you got money to buy more?: No     Within the past 12 months, did the food you bought just not last and you didn t have money to get more?: No   Transportation Needs: Low Risk  (9/26/2023)    Transportation Needs     Within the past 12 months, has lack of transportation kept you from medical appointments, getting your medicines, non-medical meetings or appointments, work, or from getting things that you need?: No   Physical Activity: Not on file   Stress: Not on file   Social Connections: Not on file   Interpersonal Safety: Not on file   Housing Stability: Low Risk  (9/26/2023)    Housing Stability     Do you have housing? : Yes     Are you worried about losing your housing?: No       No family history on file.  Family history reviewed and edited as appropriate    Medications and Allergies:     Outpatient Encounter Medications as of 2/5/2024   Medication Sig Dispense Refill    fluticasone (FLONASE) 50 MCG/ACT nasal spray Spray 1 spray into both nostrils daily 16 g 11     No facility-administered encounter medications on file as of 2/5/2024.        No Known Allergies     Review of systems:  A full 10 point review of systems was obtained and was negative except for the pertinent positives and negatives stated within the HPI.    Objective Findings:   Physical Exam:    Constitutional: /67 (BP Location: Left arm, Patient Position: Sitting, Cuff Size: Adult Regular)   Pulse 63   Ht 1.892 m (6' 2.5\")   Wt 122.1 kg (269 lb 1.6 oz)  "  SpO2 96%   BMI 34.09 kg/m    General: Alert, cooperative, no distress, well-appearing  Head: Atraumatic, normocephalic, no obvious abnormalities   Eyes: EOMI, Sclera anicteric, no obvious conjunctival hemorrhage   Nose: Nares normal, no obvious malformation, no obvious rhinorrhea   Respiratory: normal appearing respirations, no cough  Musculoskeletal: Range of motion intact, no obvious strength deficit  Skin: No jaundice, no obvious rash  Neurologic: AAOx3, no obvious neurologic abnormality  Psychiatric: Normal Affect, appropriate mood  Extremities: No obvious edema, no obvious malformation     Labs, Radiology, Pathology     Lab Results   Component Value Date    WBC 4.4 12/01/2023    HGB 15.4 12/01/2023     12/01/2023    CHOL 218 (H) 12/01/2023    CHOL 228 (H) 07/18/2022    CHOL 208 (H) 07/14/2021    TRIG 92 12/01/2023    TRIG 270 (H) 07/18/2022    TRIG 103 07/14/2021    HDL 48 12/01/2023    HDL 40 07/18/2022    HDL 47 07/14/2021    ALT 32 12/01/2023    ALT 30 07/18/2022    ALT 26 07/14/2021    AST 31 12/01/2023    AST 32 07/18/2022    AST 24 07/14/2021     12/01/2023     07/18/2022     07/14/2021    BUN 15.7 12/01/2023    BUN 13.2 07/18/2022    BUN 16 07/14/2021    CO2 27 12/01/2023    CO2 24 07/18/2022    CO2 23 07/14/2021    TSH 1.79 08/10/2021        Liver Function Studies -   Recent Labs   Lab Test 12/01/23  0749   PROTTOTAL 7.2   ALBUMIN 4.2   BILITOTAL 0.6   ALKPHOS 80   AST 31   ALT 32          Patient Active Problem List    Diagnosis Date Noted    Sensorineural hearing loss (SNHL) of right ear with restricted hearing of left ear 12/12/2023     Priority: Medium    Dysphagia, unspecified type 09/26/2023     Priority: Medium    PND (paroxysmal nocturnal dyspnea) 09/26/2023     Priority: Medium    Class 1 obesity with serious comorbidity and body mass index (BMI) of 33.0 to 33.9 in adult 09/26/2023     Priority: Medium    Encounter for preventive care 09/26/2023     Priority: Medium     Hematuria 09/26/2023     Priority: Medium    Diminished hearing 09/26/2023     Priority: Medium    Hyperlipidemia 08/03/2021     Priority: Medium    Sleep apnea 08/03/2021     Priority: Medium    Paroxysmal atrial fibrillation (H) 08/01/2021     Priority: Medium      Assessment and Plan   Assessment:    Bhavesh Davis is a 63 year old male with HLD, hearing loss, afib, obesity who is presenting as a new patient in consultation at the request of Dr. Banks with a chief complaint of dysphagia.    The patient was seen regarding his chronic dysphagia that has resolved with dilations in the past of a schatzki's ring. He did not have a durable result after his last endoscopy. We discussed a repeat endoscopy with dilation. If this is unsuccessful at improving symptoms we will consider pursuing additional testing such as HRM, pH, barium. No plans to initiate medication at this time. If there is erosive esophagitis on exam, then we will begin PPI therapy.     1. Dysphagia, unspecified type       No orders of the defined types were placed in this encounter.     Plan:  Please schedule an upper endoscopy with likely dilation  Depending on the findings future testing could include high resolution esophageal manometry, pH testing (reflux), barium esophagram    Follow up plan:   Return to clinic 6 months and as needed.    The risks and benefits of my recommendations, as well as other treatment options were discussed with the patient and any available family today. All questions were answered.     Follow up: As planned above. Today, I personally spent 25 minutes in direct face to face time with the patient, of which greater than 50% of the time was spent in patient education and counseling as described above. Approximately 10 minutes were spent on indirect care associated with the patient's consultation including but not limited to review of: patient medical records to date, clinic visits, hospital records, lab results,  imaging studies, procedural documentation, and coordinating care with other providers. The findings from this review are summarized in the above note. All of the above accounted for a cumulative time of 35 minutes and was performed on the date of service.     The patient verbalized understanding of the plan and was appreciative for the time spent and information provided during the office visit.     Author:   Rick Sanz DO   of Medicine  Director, Esophageal Disorders Program  Division of Gastroenterology, Hepatology, and Nutrition  [facility]    Dr. Sanz speaks for [employer] regarding dupilumab and has participated in advisory boards for the medication. He receives income for these activities. When discussing the medication, patients were informed of Dr. Sanz's role and potential conflict of interest. All questions and/or concerns were answered during the encounter.     Documentation assisted by voice recognition and documentation system.

## 2024-02-06 ENCOUNTER — TELEPHONE (OUTPATIENT)
Dept: GASTROENTEROLOGY | Facility: CLINIC | Age: 64
End: 2024-02-06
Payer: COMMERCIAL

## 2024-02-06 NOTE — TELEPHONE ENCOUNTER
Patient Contacted for the patient to call back and schedule the following:    Appointment type: Return Esophageal  Provider: Dr Sanz  Return date: 08/05/24  Specialty phone number: [phone]  Additional appointment(s) needed: Endoscopy  Additonal Notes: Pt stated he wishes to schedule procedure before he schedules follow up appt

## 2024-02-15 ENCOUNTER — TELEPHONE (OUTPATIENT)
Dept: GASTROENTEROLOGY | Facility: CLINIC | Age: 64
End: 2024-02-15
Payer: COMMERCIAL

## 2024-02-15 NOTE — TELEPHONE ENCOUNTER
"Endoscopy Scheduling Screen    Have you had a positive Covid test in the last 14 days?  No    Are you active on MyChart?   Yes LETTER    What insurance is in the chart?  Other:  [facility]    Ordering/Referring Provider: Rick Sanz DO     (If ordering provider performs procedure, schedule with ordering provider unless otherwise instructed. )    BMI: Estimated body mass index is 34.09 kg/m  as calculated from the following:    Height as of 2/5/24: 1.892 m (6' 2.5\").    Weight as of 2/5/24: 122.1 kg (269 lb 1.6 oz).     Sedation Ordered  MAC/deep sedation.   BMI<= 45 45 < BMI <= 48 48 < BMI < = 50  BMI > 50   No Restrictions No [facility]  No [facility]  [facility] with exceptions Hospital Only OR Only       Are you taking any prescription medications for pain 3 or more times per week?   NO - No RN review required.    Do you have a history of malignant hyperthermia or adverse reaction to anesthesia?  No    (Females) Are you currently pregnant?   No     Have you been diagnosed or told you have pulmonary hypertension?   No    Do you have an LVAD?  No    Have you been told you have moderate to severe sleep apnea?  No    Have you been told you have COPD, asthma, or any other lung disease?  No    Do you have any heart conditions?  Yes     In the past year, have you had any hospitalizations for heart related issues including cardiomyopathy, heart attack, or stent placement?  No    Do you have any implantable devices in your body (pacemaker, ICD)?  No    Do you take nitroglycerine?  No    Have you ever had an organ transplant?   No    Have you ever had or are you awaiting a heart or lung transplant?   No    Have you had a stroke or transient ischemic attack (TIA aka \"mini stroke\" in the last 6 months?   No    Have you been diagnosed with or been told you have cirrhosis of the liver?   No    Are you currently on dialysis?   No    Do you need assistance transferring?   No    BMI: Estimated body mass index is 34.09 kg/m  as calculated " "from the following:    Height as of 2/5/24: 1.892 m (6' 2.5\").    Weight as of 2/5/24: 122.1 kg (269 lb 1.6 oz).     Is patients BMI > 40 and scheduling location [facility]?  No    Do you take an injectable medication for weight loss or diabetes (excluding insulin)?  No    Do you take the medication Naltrexone?  No    Do you take blood thinners?  No       Prep   Are you currently on dialysis or do you have chronic kidney disease?  No    Do you have a diagnosis of diabetes?  No    Do you have a diagnosis of cystic fibrosis (CF)?  No    On a regular basis do you go 3 -5 days between bowel movements?  No    BMI > 40?  No    Preferred Pharmacy:      [id number] - [place], [place] - [address]  [address] [facility] [address]  Phone: [phone] Fax: [phone]      Final Scheduling Details   Colonoscopy prep sent?  N/A    Procedure scheduled  Upper endoscopy (EGD)    Surgeon:  BRYAN     Date of procedure:  6/26     Pre-OP / PAC:   No - Not required for this site.    Location  [facility] - [facility] - Patient preference.    Sedation   MAC/Deep Sedation - Per order.      Patient Reminders:   You will receive a call from a Nurse to review instructions and health history.  This assessment must be completed prior to your procedure.  Failure to complete the Nurse assessment may result in the procedure being cancelled.      On the day of your procedure, please designate an adult(s) who can drive you home stay with you for the next 24 hours. The medicines used in the exam will make you sleepy. You will not be able to drive.      You cannot take public transportation, ride share services, or non-medical taxi service without a responsible caregiver.  Medical transport services are allowed with the requirement that a responsible caregiver will receive you at your destination.  We require that drivers and caregivers are confirmed prior to your procedure.    "

## 2024-04-24 NOTE — PROGRESS NOTES
ASSESSMENT & PLAN    Bhavesh was seen today for pain.    Diagnoses and all orders for this visit:    Rotator cuff syndrome of right shoulder  -     XR Shoulder Right G/E 3 Views; Future  -     Large Joint Injection/Arthocentesis: R glenohumeral joint      63-year-old male presenting with acute on chronic right shoulder pain.  X-rays were reviewed and overall without osteoarthritic changes.  Pain mainly when reaching behind back and strength testing 5 out of 5 with full range of motion.  Appears like rotator cuff tendinitis versus partial tear and less likely acute full-thickness tear needing immediate surgical repair.  Pain is mainly symptoms today.  Discussed surgical and nonsurgical options.  Open to trying conservative measures first including home exercises and cortisone injections. Prefers home exercises and formal PT in the future . glenohumeral joint injection was completed today.  Tolerated well.  Aftercare instructions given in AVS.  Will follow-up as needed if symptoms do not improve, repeat injection, or worsening of symptoms.   PLAN:   -Xrays todays overall reassuring without fracture or break. Mild ostearthritis but pain mainly with lifting and symptoms appearing like rotator cuff injury at this time.   - US guided shoulder injection with cortisone to help with pain relief  -Home exercises  -400-600mg ibuprofen morning and night with food for one week to help with inflammation and pain. Can get over the counter.   -Cortisone injection today.. can repeat every 3-4 months as needed. Discussed risks. Consented. Tolerated well. See procedure note.     Large Joint Injection/Arthocentesis: R glenohumeral joint    Date/Time: 4/26/2024 2:53 PM    Performed by: Celi Williamson DO  Authorized by: Celi Williamson DO    Indications:  Pain  Needle Size:  22 G  Guidance: ultrasound    Approach:  Posterior  Location:  Shoulder      Site:  R glenohumeral joint  Medications:  40 mg triamcinolone 40 MG/ML; 4 mL  ROPivacaine 5 MG/ML; 3 mL lidocaine 1 %  Outcome:  Tolerated well, no immediate complications  Procedure discussed: discussed risks, benefits, and alternatives    Consent Given by:  Patient  Timeout: timeout called immediately prior to procedure    Prep: patient was prepped and draped in usual sterile fashion     Ultrasound was used to ensure safe and accurate needle placement and injection. Ultrasound images of the procedure were permanently stored.      Discussed risks and benefits of CSI injection. Agreeable to CSI injection  today. Consented. Tolerated well. Hemodynamically stable. See procedure note below. They are aware of risks such as skin hypopigmentation, hyperglycemia, bleeding, and infection.        Celi Williamson [facility]IC[facility]    -----  Chief Complaint   Patient presents with    Right Shoulder - Pain       SUBJECTIVE  Bhavesh Davis is a/an 63 year old male who is seen as a self referral for evaluation of right shoulder pain.     The patient is seen by themselves.  The patient is Left handed    Onset: 5 month(s) ago. Reports insidious onset without acute precipitating event. Gradually getting worse.   Location of Pain: right shoulder joint, anterior and laterally.  Worsened by: getting dressed, reaching behind back for wallet/belt loop, sleeping on that side, raising arm overhead.   Better with: nothing besides activity modifications  Treatments tried: no treatment tried to date  Associated symptoms: weakness of right shoulder, aches with bouts of sharp.   Denies numbness, tingling, locking  Orthopedic/Surgical history: YES - Date: one bout of shoulder pain 1990s, never evaled.   Social History/Occupation: is an ,     Patient Active Problem List   Diagnosis    Dysphagia, unspecified type    Hyperlipidemia    Paroxysmal atrial fibrillation (H)    Sleep apnea    PND (paroxysmal nocturnal dyspnea)    Class 1 obesity with serious comorbidity and  body mass index (BMI) of 33.0 to 33.9 in adult    Encounter for preventive care    Hematuria    Diminished hearing    Sensorineural hearing loss (SNHL) of right ear with restricted hearing of left ear    Esophageal dysphagia       Current Outpatient Medications   Medication Sig Dispense Refill    fluticasone (FLONASE) 50 MCG/ACT nasal spray Spray 1 spray into both nostrils daily 16 g 11       PMH, Medications and Allergies were reviewed and updated as needed.    REVIEW OF SYSTEMS:  10 point ROS is negative other than symptoms noted above in HPI        OBJECTIVE:  /70    General: healthy, alert and in no distress  Skin: no suspicious lesions or rash.  CV: distal perfusion intact  Resp: normal respiratory effort without conversational dyspnea   Psych: normal mood and affect  Gait: NORMAL  Neuro: Normal light sensory exam of right upper extremity    RIGHT SHOULDER  Inspection:    no swelling, bruising, discoloration, or obvious deformity or asymmetry  Palpation:    Tender about the suprapsinatus insertion, ant ghj . Remainder of bony and tendinous landmarks are nontender.    Crepitus is Absent  Active Range of Motion:     Abduction 1800 / FF 1800 /  / IR SI joint but painful  Strength:    Scapular plane abduction 5/5 / ER 5/5 / IR 5-/5 / biceps 5/5  Special Tests:    Negative: supraspinatus (empty can), drop arm/painful arc, and Speed's       RADIOLOGY:  Final results and radiologist's interpretation, available in the [facility] health record.  Images were reviewed with the patient in the office today.    My personal interpretation of the performed imaging:   X-ray right shoulder 3 views: 4/26/2024  X-ray shoulder right3 views:   right glenohumeral and AC joints are negative for acute fracture or dislocation.  Normal alignment. open joint space.         Review of the result(s) of each unique test - x-rays  Ordering of each unique test    Greater than 45 minutes spent by me on the date of the encounter doing chart  review, review of outside records, review of test results, interpretation of tests, and patient visit documentation, patient visit. If procedure performed, this was separate from time with procedure.           Disclaimer: This note consists of symbols derived from keyboarding, dictation and/or voice recognition software. As a result, there may be errors in the script that have gone undetected. Please consider this when interpreting information found in this chart.

## 2024-04-26 ENCOUNTER — OFFICE VISIT (OUTPATIENT)
Dept: ORTHOPEDICS | Facility: CLINIC | Age: 64
End: 2024-04-26
Payer: COMMERCIAL

## 2024-04-26 ENCOUNTER — ANCILLARY PROCEDURE (OUTPATIENT)
Dept: GENERAL RADIOLOGY | Facility: CLINIC | Age: 64
End: 2024-04-26
Attending: STUDENT IN AN ORGANIZED HEALTH CARE EDUCATION/TRAINING PROGRAM
Payer: COMMERCIAL

## 2024-04-26 VITALS — SYSTOLIC BLOOD PRESSURE: 122 MMHG | DIASTOLIC BLOOD PRESSURE: 70 MMHG

## 2024-04-26 DIAGNOSIS — M75.101 ROTATOR CUFF SYNDROME OF RIGHT SHOULDER: Primary | ICD-10-CM

## 2024-04-26 DIAGNOSIS — M25.511 PAIN OF RIGHT SHOULDER REGION: ICD-10-CM

## 2024-04-26 PROCEDURE — 20611 DRAIN/INJ JOINT/BURSA W/US: CPT | Mod: RT | Performed by: STUDENT IN AN ORGANIZED HEALTH CARE EDUCATION/TRAINING PROGRAM

## 2024-04-26 PROCEDURE — 99204 OFFICE O/P NEW MOD 45 MIN: CPT | Mod: 25 | Performed by: STUDENT IN AN ORGANIZED HEALTH CARE EDUCATION/TRAINING PROGRAM

## 2024-04-26 PROCEDURE — 73030 X-RAY EXAM OF SHOULDER: CPT | Mod: TC | Performed by: RADIOLOGY

## 2024-04-26 RX ORDER — LIDOCAINE HYDROCHLORIDE 10 MG/ML
3 INJECTION, SOLUTION INFILTRATION; PERINEURAL
Status: SHIPPED | OUTPATIENT
Start: 2024-04-26

## 2024-04-26 RX ORDER — TRIAMCINOLONE ACETONIDE 40 MG/ML
40 INJECTION, SUSPENSION INTRA-ARTICULAR; INTRAMUSCULAR
Status: SHIPPED | OUTPATIENT
Start: 2024-04-26

## 2024-04-26 RX ORDER — ROPIVACAINE HYDROCHLORIDE 5 MG/ML
4 INJECTION, SOLUTION EPIDURAL; INFILTRATION; PERINEURAL
Status: SHIPPED | OUTPATIENT
Start: 2024-04-26

## 2024-04-26 RX ADMIN — ROPIVACAINE HYDROCHLORIDE 4 ML: 5 INJECTION, SOLUTION EPIDURAL; INFILTRATION; PERINEURAL at 14:53

## 2024-04-26 RX ADMIN — LIDOCAINE HYDROCHLORIDE 3 ML: 10 INJECTION, SOLUTION INFILTRATION; PERINEURAL at 14:53

## 2024-04-26 RX ADMIN — TRIAMCINOLONE ACETONIDE 40 MG: 40 INJECTION, SUSPENSION INTRA-ARTICULAR; INTRAMUSCULAR at 14:53

## 2024-04-26 NOTE — PATIENT INSTRUCTIONS
1. Rotator cuff syndrome of right shoulder      -Xrays todays overall reassuring without fracture or break. Mild ostearthritis but pain mainly with lifting and symptoms appearing like rotator cuff injury at this time.   - US guided shoulder injection with cortisone to help with pain relief  -Home exercises  -400-600mg ibuprofen morning and night with food for one week to help with inflammation and pain. Can get over the counter.   -Cortisone injection today.. can repeat every 3-4 months as needed. Discussed risks. Consented. Tolerated well. See procedure note.        Injection Discharge Instructions    You may shower, however avoid swimming, tub baths or hot tubs for 24 hours following your procedure  You may have a mild to moderate increase in pain for several days following the injection.  It may take up to 14 days for the steroid medication to start working although you may feel the effect as early as a few days after the procedure.  You may use ice packs for 10-15 minutes, 3 to 4 times a day at the injection site for comfort  You may use anti-inflammatory medications (such as Ibuprofen or Aleve or Advil) or Tylenol for pain control if necessary  If you were fasting, you may resume your normal diet and medications after the procedure  If you have diabetes, check your blood sugar more frequently than usual as your blood sugar may be higher than normal for 10-14 days following a steroid injection. Contact your doctor who manages your diabetes if your blood sugar is higher than usual    If you experience any of the following, call  @ [phone] or [phone]  -Fever over 100 degree F  -Swelling, bleeding, redness, drainage, warmth at the injection site  - New or worsening pain    Please call [phone]  Ask for my team if you have any questions or concerns    Celi Williamson DO  [facility]      Thank you for choosing [facility]!    CLINIC LOCATIONS:      [place]  TRIAGE LINE: [phone]   [address] APPOINTMENTS: [phone]   [place] [address] RADIOLOGY: [phone]   (Monday, Thursday & Friday) PHYSICAL THERAPY: [phone]    BILLING QUESTIONS: [phone]   [place] FAX: [phone] [address]    [place] [address]    (Wednesday)      Patient Education   Shoulder Stretches: Exercises  Introduction  Here are some examples of exercises for you to try. The exercises may be suggested for a condition or for rehabilitation. Start each exercise slowly. Ease off the exercises if you start to have pain.  You will be told when to start these exercises and which ones will work best for you.  How to do the exercises  Anterior shoulder stretch (in doorway)     a doorway and place one forearm against the door frame. Your elbow should be bent and a little higher than your shoulder.  Relax your shoulder as you lean forward, allowing your chest and shoulder muscles to stretch. You can also turn your body slightly away from your arm to stretch the muscles even more.  Hold for 15 to 30 seconds.  Repeat 2 to 4 times.  Repeat these steps with your other arm.  Chest expansion    Sit or stand up straight with your feet shoulder-width apart.  Look straight ahead, and do not allow your head to tilt back. As you take a deep breath, open your arms out to the sides and roll your arms back. Your palms will turn outward, and you will feel a stretch across your chest.  Hold this stretch for 15 to 30 seconds. Keep breathing normally and don't hold your breath.  Slowly lower your arms to your sides and let your palms turn back toward your legs.  Repeat 2 to 4 times.  Overhead stretch    Stand up straight with your feet shoulder-width apart. Or sit up straight in a chair.  Looking straight ahead, raise both arms over your head. Reach up and back with your arms until you feel a stretch in your shoulders. Do not allow your head to tilt  back.  Hold for 15 to 30 seconds, then lower your arms to your sides.  Repeat 2 to 4 times.  Follow-up care is a key part of your treatment and safety. Be sure to make and go to all appointments, and call your doctor if you are having problems. It's also a good idea to know your test results and keep a list of the medicines you take.  Current as of: July 17, 2023               Content Version: 14.0    0553-0346 [employer].   Care instructions adapted under license by your healthcare professional. If you have questions about a medical condition or this instruction, always ask your healthcare professional. [employer] disclaims any warranty or liability for your use of this information.    Patient Education   Shoulder Arthritis: Exercises  Introduction  Here are some examples of exercises for you to try. The exercises may be suggested for a condition or for rehabilitation. Start each exercise slowly. Ease off the exercises if you start to have pain.  You will be told when to start these exercises and which ones will work best for you.  How to do the exercises  Shoulder extensor stretch (lying down, with wand)    Lie on your back with your knees bent. Hold a wand with both hands, placing one hand near each end of the wand. (You can also use a broom handle or anything stiff and about 3 feet long.) Your palms should face down as you hold the wand. Straighten your elbows and rest the wand on your legs, just below your hips. This is your starting position.  Keeping your elbows straight, slowly raise your arms over your head. Raise them until you feel a stretch in your shoulders, upper back, and chest. Try not to shrug your shoulders.  Hold for 15 to 30 seconds, and then return to the starting position.  Repeat 2 to 4 times.  Shoulder rotation (lying down, with wand)    Lie on your back. Hold a wand with both hands with your elbows bent and palms up. You can also use a broom handle or anything  stiff and about 3 feet long.  Hold your elbows close to your body, and move the wand across your body toward the affected arm.  Hold for 15 to 30 seconds, and then return to the starting position.  Repeat 2 to 4 times.  It's a good idea to repeat these steps toward your other arm.  Shoulder internal rotation stretch (with towel)    Roll up a towel lengthwise. Put the towel over your unaffected shoulder and hold the front end with your unaffected hand.  With your affected arm, reach behind your back and grasp the other end of the towel.  There are two ways to stretch your affected shoulder.  Hold for 15 to 30 seconds.  Relax and move the towel back to the starting position.  Repeat 2 to 4 times.  If you can, repeat these steps for your other shoulder.  With the towel lying on your shoulder, pull the front end of the towel down with your unaffected arm until you feel a stretch in the front and outside of your affected shoulder.  Pull the front end of the towel straight up above your head with your unaffected arm until you feel a stretch in the front and outside of your affected shoulder.  Shoulder-blade squeeze    Sit or stand up straight with your arms at your sides.  Keep your shoulders relaxed and down, not shrugged.  Squeeze your shoulder blades down and together.  Hold for about 6 seconds, then relax.  Repeat 8 to 12 times.  Resisted row    [place] an exercise band at about waist level. You can loop the band around a solid object, like a bedpost or handrail. Or you can tie a knot in the middle of the band and shut a door on the band so the knot is on the other side of the door. (Or you can have someone hold one end of the loop to provide resistance.)  Stand or sit facing where you have placed the band. Hold one end of the band in each hand.  Hold your arms out in front of you. Adjust your hold on the band so you have some tension on it.  With your shoulders relaxed, pull the bands back, and move your shoulder  blades toward each other. Your elbows will pass along your waist.  Slowly return to the starting position.  Repeat 8 to 12 times.  Shoulder external rotation (resisted)    Tie the ends of an exercise band together to form a loop. Attach one end of the loop to a secure object, or shut a door on it to hold it in place. Or you can tie a knot in one end of the band and shut the door with the knot on the other side. The band should be at about waist height.  Stand or sit with your unaffected side toward the door.  Hold one end of the band with the hand of your affected arm, and bend your elbow to 90 degrees. Keep your upper arm against your body. You can squeeze a rolled towel between your elbow and your body for comfort. This will help keep your arm at your side.  Start with your forearm across your belly and your shoulder relaxed. Slowly rotate your forearm out away from your body. Keep your elbow and upper arm tucked against the towel roll or the side of your body until you begin to feel tightness in your shoulder. Slowly move your arm back to where you started. Your shoulder should stay relaxed throughout the exercise.  Repeat 8 to 12 times.  It's a good idea to repeat these steps with your other arm.  Shoulder internal rotation (resisted)    Tie the ends of an exercise band together to form a loop. Attach one end of the loop to a secure object, or shut a door on it to hold it in place. Or you can tie a knot in one end of the band and shut the door with the knot on the other side. The band should be at about waist height.  Stand or sit with your affected side toward the door.  Hold the free end of the exercise band with the hand of your affected arm, and bend your elbow to 90 degrees. Keep your upper arm against your body. You can squeeze a rolled towel between your elbow and your body for comfort. This will help keep your arm at your side.  Start with your arm pointing straight ahead and your shoulder relaxed.  Slowly rotate your forearm toward your body until it touches your belly. As you do this, keep your elbow and upper arm firmly tucked against the towel or at your side. Slowly move it back to where you started. Your shoulder should stay relaxed throughout the exercise.  Repeat 8 to 12 times.  It's a good idea to repeat these steps with your other arm.  Pendulum swing    Hold on to a table or the back of a chair with your unaffected arm. Then bend forward a little and let your affected arm hang straight down. This exercise does not use the arm muscles. Rather, use your legs and your hips to create movement that makes your arm swing freely.  Use the movement from your hips and legs to guide the slightly swinging arm forward and backward like a pendulum (or elephant trunk). Then guide it in circles that start small (about the size of a dinner plate). Make the circles a bit larger each day, as your pain allows.  Do this exercise for at least 1 minute. Do it at least 3 times a day.  As you have less pain, try bending over a little farther to do this exercise. This will increase the amount of movement at your shoulder.  Follow-up care is a key part of your treatment and safety. Be sure to make and go to all appointments, and call your doctor if you are having problems. It's also a good idea to know your test results and keep a list of the medicines you take.  Current as of: July 17, 2023               Content Version: 14.0    5318-7119 [employer].   Care instructions adapted under license by your healthcare professional. If you have questions about a medical condition or this instruction, always ask your healthcare professional. [employer] disclaims any warranty or liability for your use of this information.

## 2024-04-26 NOTE — LETTER
4/26/2024         RE: Bhavesh Davis  [address]  [facility] [address]        Dear Colleague,    Thank you for referring your patient, Bhavesh Davis, to the [facility]. Please see a copy of my visit note below.    ASSESSMENT & PLAN    Bhavesh was seen today for pain.    Diagnoses and all orders for this visit:    Rotator cuff syndrome of right shoulder  -     XR Shoulder Right G/E 3 Views; Future  -     Large Joint Injection/Arthocentesis: R glenohumeral joint      63-year-old male presenting with acute on chronic right shoulder pain.  X-rays were reviewed and overall without osteoarthritic changes.  Pain mainly when reaching behind back and strength testing 5 out of 5 with full range of motion.  Appears like rotator cuff tendinitis versus partial tear and less likely acute full-thickness tear needing immediate surgical repair.  Pain is mainly symptoms today.  Discussed surgical and nonsurgical options.  Open to trying conservative measures first including home exercises and cortisone injections. Prefers home exercises and formal PT in the future . glenohumeral joint injection was completed today.  Tolerated well.  Aftercare instructions given in AVS.  Will follow-up as needed if symptoms do not improve, repeat injection, or worsening of symptoms.   PLAN:   -Xrays todays overall reassuring without fracture or break. Mild ostearthritis but pain mainly with lifting and symptoms appearing like rotator cuff injury at this time.   - US guided shoulder injection with cortisone to help with pain relief  -Home exercises  -400-600mg ibuprofen morning and night with food for one week to help with inflammation and pain. Can get over the counter.   -Cortisone injection today.. can repeat every 3-4 months as needed. Discussed risks. Consented. Tolerated well. See procedure note.     Large Joint Injection/Arthocentesis: R glenohumeral joint    Date/Time: 4/26/2024 2:53  PM    Performed by: Celi Williamson DO  Authorized by: Celi Williamson DO    Indications:  Pain  Needle Size:  22 G  Guidance: ultrasound    Approach:  Posterior  Location:  Shoulder      Site:  R glenohumeral joint  Medications:  40 mg triamcinolone 40 MG/ML; 4 mL ROPivacaine 5 MG/ML; 3 mL lidocaine 1 %  Outcome:  Tolerated well, no immediate complications  Procedure discussed: discussed risks, benefits, and alternatives    Consent Given by:  Patient  Timeout: timeout called immediately prior to procedure    Prep: patient was prepped and draped in usual sterile fashion     Ultrasound was used to ensure safe and accurate needle placement and injection. Ultrasound images of the procedure were permanently stored.      Discussed risks and benefits of CSI injection. Agreeable to CSI injection  today. Consented. Tolerated well. Hemodynamically stable. See procedure note below. They are aware of risks such as skin hypopigmentation, hyperglycemia, bleeding, and infection.        Celi SALAS [facility]    -----  Chief Complaint   Patient presents with     Right Shoulder - Pain       SUBJECTIVE  Bhavesh Davis is a/an 63 year old male who is seen as a self referral for evaluation of right shoulder pain.     The patient is seen by themselves.  The patient is Left handed    Onset: 5 month(s) ago. Reports insidious onset without acute precipitating event. Gradually getting worse.   Location of Pain: right shoulder joint, anterior and laterally.  Worsened by: getting dressed, reaching behind back for wallet/belt loop, sleeping on that side, raising arm overhead.   Better with: nothing besides activity modifications  Treatments tried: no treatment tried to date  Associated symptoms: weakness of right shoulder, aches with bouts of sharp.   Denies numbness, tingling, locking  Orthopedic/Surgical history: YES - Date: one bout of shoulder pain 1990s, never evaled.   Social  History/Occupation: is an ,     Patient Active Problem List   Diagnosis     Dysphagia, unspecified type     Hyperlipidemia     Paroxysmal atrial fibrillation (H)     Sleep apnea     PND (paroxysmal nocturnal dyspnea)     Class 1 obesity with serious comorbidity and body mass index (BMI) of 33.0 to 33.9 in adult     Encounter for preventive care     Hematuria     Diminished hearing     Sensorineural hearing loss (SNHL) of right ear with restricted hearing of left ear     Esophageal dysphagia       Current Outpatient Medications   Medication Sig Dispense Refill     fluticasone (FLONASE) 50 MCG/ACT nasal spray Spray 1 spray into both nostrils daily 16 g 11       PMH, Medications and Allergies were reviewed and updated as needed.    REVIEW OF SYSTEMS:  10 point ROS is negative other than symptoms noted above in HPI        OBJECTIVE:  /70    General: healthy, alert and in no distress  Skin: no suspicious lesions or rash.  CV: distal perfusion intact  Resp: normal respiratory effort without conversational dyspnea   Psych: normal mood and affect  Gait: NORMAL  Neuro: Normal light sensory exam of right upper extremity    RIGHT SHOULDER  Inspection:    no swelling, bruising, discoloration, or obvious deformity or asymmetry  Palpation:    Tender about the suprapsinatus insertion, ant ghj . Remainder of bony and tendinous landmarks are nontender.    Crepitus is Absent  Active Range of Motion:     Abduction 1800 / FF 1800 /  / IR SI joint but painful  Strength:    Scapular plane abduction 5/5 / ER 5/5 / IR 5-/5 / biceps 5/5  Special Tests:    Negative: supraspinatus (empty can), drop arm/painful arc, and Speed's       RADIOLOGY:  Final results and radiologist's interpretation, available in the [facility] health record.  Images were reviewed with the patient in the office today.    My personal interpretation of the performed imaging:   X-ray right shoulder 3 views: 4/26/2024  X-ray shoulder right3 views:   right  glenohumeral and AC joints are negative for acute fracture or dislocation.  Normal alignment. open joint space.         Review of the result(s) of each unique test - x-rays  Ordering of each unique test    Greater than 45 minutes spent by me on the date of the encounter doing chart review, review of outside records, review of test results, interpretation of tests, and patient visit documentation, patient visit. If procedure performed, this was separate from time with procedure.           Disclaimer: This note consists of symbols derived from keyboarding, dictation and/or voice recognition software. As a result, there may be errors in the script that have gone undetected. Please consider this when interpreting information found in this chart.       Again, thank you for allowing me to participate in the care of your patient.        Sincerely,        Celi Williamson, DO

## 2024-06-17 ENCOUNTER — TELEPHONE (OUTPATIENT)
Dept: GASTROENTEROLOGY | Facility: CLINIC | Age: 64
End: 2024-06-17
Payer: COMMERCIAL

## 2024-06-17 NOTE — TELEPHONE ENCOUNTER
Pre visit planning completed.      Procedure details:    Patient scheduled for Upper endoscopy (EGD) on 6/26/24.     Arrival time: 0715. Procedure time 0815    Facility location: [facility]; [address] [place] [address], 5[address] Floor, [place] [address]. Check in location: 5th Floor.    Sedation type: MAC    Pre op exam needed? N/A    Indication for procedure:   Esophageal dysphagia            Chart review:     Electronic implanted devices? No    Recent diagnosis of diverticulitis within the last 6 weeks? No    Diabetic? No      Medication review:    Anticoagulants? No    NSAIDS? No    Other medication HOLDING recommendations:  N/A      Prep for procedure:       Prep instructions sent via Virtual 3-D Display for Smartphones         Rupal Becker RN  Endoscopy Procedure Pre Assessment RN  [phone] option 4

## 2024-06-18 NOTE — TELEPHONE ENCOUNTER
Pre assessment completed for upcoming procedure.   (Please see previous telephone encounter notes for complete details)    Patient  returned call.       Procedure details:    Arrival time and facility location reviewed.    Pre op exam needed? N/A    Designated  policy reviewed. Instructed to have someone stay 24  hours post procedure.       Medication review:    Medications reviewed. Please see supporting documentation below. Holding recommendations discussed (if applicable).       Prep for procedure:     Procedure prep instructions reviewed.        Any additional information needed:  N/A      Patient  verbalized understanding and had no questions or concerns at this time.      Rupal Park RN  Endoscopy Procedure Pre Assessment   [phone] option 4

## 2024-06-18 NOTE — TELEPHONE ENCOUNTER
Attempted to contact patient in order to complete pre assessment questions.     Patient scheduled for Upper endoscopy (EGD) on 6/26/24.     Patient did answer but was not a good time he will return call to [phone] option 4    Callback required communication sent via Medivo.      Rupal Becker RN  Endoscopy Procedure Pre Assessment

## 2024-06-26 ENCOUNTER — ANESTHESIA EVENT (OUTPATIENT)
Dept: SURGERY | Facility: AMBULATORY SURGERY CENTER | Age: 64
End: 2024-06-26
Payer: COMMERCIAL

## 2024-06-26 ENCOUNTER — ANESTHESIA (OUTPATIENT)
Dept: SURGERY | Facility: AMBULATORY SURGERY CENTER | Age: 64
End: 2024-06-26
Payer: COMMERCIAL

## 2024-06-26 ENCOUNTER — HOSPITAL ENCOUNTER (OUTPATIENT)
Facility: AMBULATORY SURGERY CENTER | Age: 64
Discharge: HOME | End: 2024-06-26
Attending: INTERNAL MEDICINE
Payer: COMMERCIAL

## 2024-06-26 VITALS
HEART RATE: 52 BPM | BODY MASS INDEX: 33.37 KG/M2 | DIASTOLIC BLOOD PRESSURE: 66 MMHG | SYSTOLIC BLOOD PRESSURE: 105 MMHG | OXYGEN SATURATION: 96 % | WEIGHT: 260 LBS | RESPIRATION RATE: 12 BRPM | TEMPERATURE: 96.6 F | HEIGHT: 74 IN

## 2024-06-26 VITALS — HEART RATE: 72 BPM

## 2024-06-26 LAB — UPPER GI ENDOSCOPY: NORMAL

## 2024-06-26 PROCEDURE — 43249 ESOPH EGD DILATION <30 MM: CPT | Performed by: NURSE ANESTHETIST, CERTIFIED REGISTERED

## 2024-06-26 PROCEDURE — 43239 EGD BIOPSY SINGLE/MULTIPLE: CPT | Mod: 59 | Performed by: INTERNAL MEDICINE

## 2024-06-26 PROCEDURE — 88305 TISSUE EXAM BY PATHOLOGIST: CPT | Mod: TC | Performed by: INTERNAL MEDICINE

## 2024-06-26 PROCEDURE — 43249 ESOPH EGD DILATION <30 MM: CPT | Performed by: INTERNAL MEDICINE

## 2024-06-26 PROCEDURE — 43249 ESOPH EGD DILATION <30 MM: CPT | Performed by: ANESTHESIOLOGY

## 2024-06-26 PROCEDURE — 88305 TISSUE EXAM BY PATHOLOGIST: CPT | Mod: 26 | Performed by: PATHOLOGY

## 2024-06-26 RX ORDER — PROPOFOL 10 MG/ML
INJECTION, EMULSION INTRAVENOUS PRN
Status: DISCONTINUED | OUTPATIENT
Start: 2024-06-26 | End: 2024-06-26

## 2024-06-26 RX ORDER — NALOXONE HYDROCHLORIDE 0.4 MG/ML
0.4 INJECTION, SOLUTION INTRAMUSCULAR; INTRAVENOUS; SUBCUTANEOUS
Status: CANCELLED | OUTPATIENT
Start: 2024-06-26

## 2024-06-26 RX ORDER — NALOXONE HYDROCHLORIDE 0.4 MG/ML
0.2 INJECTION, SOLUTION INTRAMUSCULAR; INTRAVENOUS; SUBCUTANEOUS
Status: CANCELLED | OUTPATIENT
Start: 2024-06-26

## 2024-06-26 RX ORDER — ONDANSETRON 2 MG/ML
4 INJECTION INTRAMUSCULAR; INTRAVENOUS
Status: DISCONTINUED | OUTPATIENT
Start: 2024-06-26 | End: 2024-06-27 | Stop reason: HOSPADM

## 2024-06-26 RX ORDER — GLYCOPYRROLATE 0.2 MG/ML
INJECTION, SOLUTION INTRAMUSCULAR; INTRAVENOUS PRN
Status: DISCONTINUED | OUTPATIENT
Start: 2024-06-26 | End: 2024-06-26

## 2024-06-26 RX ORDER — LIDOCAINE HYDROCHLORIDE 20 MG/ML
INJECTION, SOLUTION INFILTRATION; PERINEURAL PRN
Status: DISCONTINUED | OUTPATIENT
Start: 2024-06-26 | End: 2024-06-26

## 2024-06-26 RX ORDER — ONDANSETRON 4 MG/1
4 TABLET, ORALLY DISINTEGRATING ORAL EVERY 6 HOURS PRN
Status: CANCELLED | OUTPATIENT
Start: 2024-06-26

## 2024-06-26 RX ORDER — ONDANSETRON 2 MG/ML
4 INJECTION INTRAMUSCULAR; INTRAVENOUS EVERY 6 HOURS PRN
Status: CANCELLED | OUTPATIENT
Start: 2024-06-26

## 2024-06-26 RX ORDER — LIDOCAINE 40 MG/G
CREAM TOPICAL
Status: DISCONTINUED | OUTPATIENT
Start: 2024-06-26 | End: 2024-06-27 | Stop reason: HOSPADM

## 2024-06-26 RX ORDER — SODIUM CHLORIDE, SODIUM LACTATE, POTASSIUM CHLORIDE, CALCIUM CHLORIDE 600; 310; 30; 20 MG/100ML; MG/100ML; MG/100ML; MG/100ML
INJECTION, SOLUTION INTRAVENOUS CONTINUOUS
Status: DISCONTINUED | OUTPATIENT
Start: 2024-06-26 | End: 2024-06-27 | Stop reason: HOSPADM

## 2024-06-26 RX ORDER — PROCHLORPERAZINE MALEATE 10 MG
10 TABLET ORAL EVERY 6 HOURS PRN
Status: CANCELLED | OUTPATIENT
Start: 2024-06-26

## 2024-06-26 RX ORDER — PROPOFOL 10 MG/ML
INJECTION, EMULSION INTRAVENOUS CONTINUOUS PRN
Status: DISCONTINUED | OUTPATIENT
Start: 2024-06-26 | End: 2024-06-26

## 2024-06-26 RX ORDER — FLUMAZENIL 0.1 MG/ML
0.2 INJECTION, SOLUTION INTRAVENOUS
Status: CANCELLED | OUTPATIENT
Start: 2024-06-26 | End: 2024-06-26

## 2024-06-26 RX ADMIN — PROPOFOL 50 MG: 10 INJECTION, EMULSION INTRAVENOUS at 08:21

## 2024-06-26 RX ADMIN — PROPOFOL 50 MG: 10 INJECTION, EMULSION INTRAVENOUS at 08:19

## 2024-06-26 RX ADMIN — LIDOCAINE HYDROCHLORIDE 100 MG: 20 INJECTION, SOLUTION INFILTRATION; PERINEURAL at 08:09

## 2024-06-26 RX ADMIN — GLYCOPYRROLATE 0.1 MG: 0.2 INJECTION, SOLUTION INTRAMUSCULAR; INTRAVENOUS at 08:09

## 2024-06-26 RX ADMIN — SODIUM CHLORIDE, SODIUM LACTATE, POTASSIUM CHLORIDE, CALCIUM CHLORIDE: 600; 310; 30; 20 INJECTION, SOLUTION INTRAVENOUS at 07:32

## 2024-06-26 RX ADMIN — PROPOFOL 30 MG: 10 INJECTION, EMULSION INTRAVENOUS at 08:23

## 2024-06-26 RX ADMIN — PROPOFOL 50 MG: 10 INJECTION, EMULSION INTRAVENOUS at 08:10

## 2024-06-26 RX ADMIN — PROPOFOL 200 MCG/KG/MIN: 10 INJECTION, EMULSION INTRAVENOUS at 08:10

## 2024-06-26 ASSESSMENT — ENCOUNTER SYMPTOMS: DYSRHYTHMIAS: 1

## 2024-06-26 NOTE — ANESTHESIA CARE TRANSFER NOTE
Patient: Bhavesh Davis    Procedure: Procedure(s):  ESOPHAGOGASTRODUODENOSCOPY, WITH BIOPSY AND BALLOON DILATION       Diagnosis: Esophageal dysphagia [R13.19]  Diagnosis Additional Information: No value filed.    Anesthesia Type:   MAC     Note:    Oropharynx: oropharynx clear of all foreign objects and spontaneously breathing  Level of Consciousness: drowsy  Oxygen Supplementation: room air    Independent Airway: airway patency satisfactory and stable  Dentition: dentition unchanged  Vital Signs Stable: post-procedure vital signs reviewed and stable  Report to RN Given: handoff report given  Patient transferred to: Phase II    Handoff Report: Identifed the Patient, Identified the Reponsible Provider, Reviewed the pertinent medical history, Discussed the surgical course, Reviewed Intra-OP anesthesia mangement and issues during anesthesia, Set expectations for post-procedure period and Allowed opportunity for questions and acknowledgement of understanding      Vitals:  Vitals Value Taken Time   /66 06/26/24 0830   Temp 35.9  C (96.6  F) 06/26/24 0830   Pulse 65 06/26/24 0830   Resp 12 06/26/24 0830   SpO2 94 % 06/26/24 0830       Electronically Signed By: CURLY Rock CRNA  June 26, 2024  8:33 AM

## 2024-06-26 NOTE — ANESTHESIA POSTPROCEDURE EVALUATION
Patient: Bhavesh Davis    Procedure: Procedure(s):  ESOPHAGOGASTRODUODENOSCOPY, WITH BIOPSY AND BALLOON DILATION       Anesthesia Type:  MAC    Note:  Disposition: Outpatient   Postop Pain Control: Uneventful            Sign Out: Well controlled pain   PONV: No   Neuro/Psych: Uneventful            Sign Out: Acceptable/Baseline neuro status   Airway/Respiratory: Uneventful            Sign Out: Acceptable/Baseline resp. status   CV/Hemodynamics: Uneventful            Sign Out: Acceptable CV status; No obvious hypovolemia; No obvious fluid overload   Other NRE: NONE   DID A NON-ROUTINE EVENT OCCUR?            Last vitals:  Vitals Value Taken Time   /66 06/26/24 0856   Temp 35.9  C (96.6  F) 06/26/24 0856   Pulse 52 06/26/24 0856   Resp 12 06/26/24 0856   SpO2 96 % 06/26/24 0856       Electronically Signed By: Ulises Benjamin MD  June 26, 2024  11:31 AM

## 2024-06-26 NOTE — DISCHARGE INSTRUCTIONS
[facility]  Home Care Following Anesthesia  For 24 hours after surgery:  Get plenty of rest.  A responsible adult must stay with you for at least 24 hours after you leave the surgery center.  Do not drive or use heavy equipment.  If you have weakness or tingling, don't drive or use heavy equipment until this feeling goes away.   Do not drink alcohol.   Avoid strenuous or risky activities.  Ask for help when climbing stairs.  You may feel lightheaded.  IF so, sit for a few minutes before standing.  Have someone help you get up.   If you have nausea (feel sick to your stomach): Drink only clear liquids such as apple juice, ginger ale, broth or 7-Up.  Rest may also help.  Be sure to drink enough fluids.  Move to a regular diet as you feel able.   You may have a slight fever.  Call the doctor if your fever is over 100 F (37.7 C) (taken under the tongue) or lasts longer than 24 hours.  You may have a dry mouth, a sore throat, muscle aches or trouble sleeping. These should go away after 24 hours.  Do not make important or legal decisions.   It is recommended to avoid smoking.               Tips for taking pain medications  To get the best pain relief possible, remember these points:  Take pain medications as directed, before pain becomes severe.  Pain medication can upset your stomach: taking it with food may help.  Constipation is a common side effect of pain medication. Drink plenty of  fluids.  Eat foods high in fiber. Take a stool softener if recommended by your doctor or pharmacist.  Do not drink alcohol, drive or operate machinery while taking pain medications.  Ask about other ways to control pain, such as with heat, ice or relaxation.    Tylenol/Acetaminophen Consumption    If you feel your pain relief is insufficient, you may take Tylenol/Acetaminophen in addition to your narcotic pain medication.   Be careful not to exceed 4,000 mg of Tylenol/Acetaminophen in a 24 hour  period from all sources.  If you are taking extra strength Tylenol/acetaminophen (500 mg), the maximum dose is 8 tablets in 24 hours.  If you are taking regular strength acetaminophen (325 mg), the maximum dose is 12 tablets in 24 hours.    Call a doctor for any of the following:  Signs of infection (fever, growing tenderness at the surgery site, a large amount of drainage or bleeding, severe pain, foul-smelling drainage, redness, swelling).  It has been over 8 to 10 hours since surgery and you are still not able to urinate (pass water).  Headache for over 24 hours.  Numbness, tingling or weakness the day after surgery (if you had spinal anesthesia).  Signs of Covid-19 infection (temperature over 100 degrees, shortness of breath, cough, loss of taste/smell, generalized body aches, persistent headache, chills, sore throat, nausea/vomiting/diarrhea)  Your doctor is MD Rick Sanz

## 2024-06-26 NOTE — H&P
"Bhavesh Davis  [id number]  male  63 year old      Reason for procedure/surgery: egd, dysphagia, prior schatzki ring    Patient Active Problem List   Diagnosis    Dysphagia, unspecified type    Hyperlipidemia    Paroxysmal atrial fibrillation (H)    Sleep apnea    PND (paroxysmal nocturnal dyspnea)    Class 1 obesity with serious comorbidity and body mass index (BMI) of 33.0 to 33.9 in adult    Encounter for preventive care    Hematuria    Diminished hearing    Sensorineural hearing loss (SNHL) of right ear with restricted hearing of left ear    Esophageal dysphagia       Past Surgical History:    Past Surgical History:   Procedure Laterality Date    CARPAL TUNNEL RELEASE RT/LT Bilateral        Past Medical History: No past medical history on file.    Social History:   Social History     Tobacco Use    Smoking status: Never     Passive exposure: Past    Smokeless tobacco: Never   Substance Use Topics    Alcohol use: Yes     Alcohol/week: 0.0 - 1.0 standard drinks of alcohol     Comment: Very rare - once per month       Family History: History reviewed. No pertinent family history.    Allergies: No Known Allergies    Active Medications:   Current Outpatient Medications   Medication Sig Dispense Refill    fluticasone (FLONASE) 50 MCG/ACT nasal spray Spray 1 spray into both nostrils daily 16 g 11       Systemic Review:   CONSTITUTIONAL: NEGATIVE for fever, chills, change in weight  ENT/MOUTH: NEGATIVE for ear, mouth and throat problems  RESP: NEGATIVE for significant cough or SOB  CV: NEGATIVE for chest pain, palpitations or peripheral edema    Physical Examination:   Vital Signs: /72 (BP Location: Right arm)   Pulse 53   Temp 97  F (36.1  C) (Temporal)   Resp 18   Ht 1.88 m (6' 2\")   Wt 117.9 kg (260 lb)   SpO2 95%   BMI 33.38 kg/m    GENERAL: healthy, alert and no distress  NECK: no adenopathy, no asymmetry, masses, or scars  RESP: lungs clear to auscultation - no rales, rhonchi or wheezes  CV: regular " rate and rhythm, normal S1 S2, no S3 or S4, no murmur, click or rub, no peripheral edema and peripheral pulses strong  ABDOMEN: soft, nontender, no hepatosplenomegaly, no masses and bowel sounds normal  MS: no gross musculoskeletal defects noted, no edema    I examined patient s dentition and informed the patient that dental injuries are a risk of any anesthetic management. No concerns were noted with this patient's dentition. . The patient has consented to proceed with the procedure.    Plan: Appropriate to proceed as scheduled.      Rick Sanz DO  6/26/2024    PCP:  Wendy Banks

## 2024-06-26 NOTE — ANESTHESIA PREPROCEDURE EVALUATION
Anesthesia Pre-Procedure Evaluation    Patient: Bhavesh Davis   MRN: [id number] : 1960        Procedure : Procedure(s):  Esophagoscopy, gastroscopy, duodenoscopy (EGD), combined          No past medical history on file.   Past Surgical History:   Procedure Laterality Date    CARPAL TUNNEL RELEASE RT/LT Bilateral       No Known Allergies   Social History     Tobacco Use    Smoking status: Never     Passive exposure: Past    Smokeless tobacco: Never   Substance Use Topics    Alcohol use: Yes     Alcohol/week: 0.0 - 1.0 standard drinks of alcohol     Comment: Very rare - once per month      Wt Readings from Last 1 Encounters:   24 122.1 kg (269 lb 1.6 oz)        Anesthesia Evaluation            ROS/MED HX  ENT/Pulmonary:     (+) sleep apnea, mild,                                       Neurologic:  - neg neurologic ROS     Cardiovascular:     (+) Dyslipidemia - -   -  - -                        dysrhythmias, a-fib,             METS/Exercise Tolerance:     Hematologic:       Musculoskeletal:       GI/Hepatic:  - neg GI/hepatic ROS     Renal/Genitourinary:  - neg Renal ROS     Endo:     (+)               Obesity,       Psychiatric/Substance Use:  - neg psychiatric ROS     Infectious Disease:  - neg infectious disease ROS     Malignancy:       Other:            Physical Exam    Airway  airway exam normal      Mallampati: I       Respiratory Devices and Support         Dental       (+) Minor Abnormalities - some fillings, tiny chips      Cardiovascular   cardiovascular exam normal          Pulmonary   pulmonary exam normal                OUTSIDE LABS:  CBC:   Lab Results   Component Value Date    WBC 4.4 2023    HGB 15.4 2023    HCT 45.3 2023     2023     BMP:   Lab Results   Component Value Date     2023     2022    POTASSIUM 4.3 2023    POTASSIUM 4.2 2022    CHLORIDE 102 2023    CHLORIDE 100 2022    CO2 27 2023    CO2 24  "07/18/2022    BUN 15.7 12/01/2023    BUN 13.2 07/18/2022    CR 0.99 12/01/2023    CR 0.94 07/18/2022    GLC 97 12/01/2023    GLC 93 07/18/2022     COAGS: No results found for: \"PTT\", \"INR\", \"FIBR\"  POC: No results found for: \"BGM\", \"HCG\", \"HCGS\"  HEPATIC:   Lab Results   Component Value Date    ALBUMIN 4.2 12/01/2023    PROTTOTAL 7.2 12/01/2023    ALT 32 12/01/2023    AST 31 12/01/2023    ALKPHOS 80 12/01/2023    BILITOTAL 0.6 12/01/2023     OTHER:   Lab Results   Component Value Date    JORDAN 9.0 12/01/2023    TSH 1.79 08/10/2021       Anesthesia Plan    ASA Status:  2    NPO Status:  NPO Appropriate    Anesthesia Type: MAC.     - Reason for MAC: straight local not clinically adequate   Induction: Intravenous.   Maintenance: TIVA.        Consents    Anesthesia Plan(s) and associated risks, benefits, and realistic alternatives discussed. Questions answered and patient/representative(s) expressed understanding.     - Discussed: Risks, Benefits and Alternatives for BOTH SEDATION and the PROCEDURE were discussed     - Discussed with:  Patient            Postoperative Care       PONV prophylaxis: Ondansetron (or other 5HT-3), Background Propofol Infusion     Comments:               Ulises Benjamin MD    I have reviewed the pertinent notes and labs in the chart from the past 30 days and (re)examined the patient.  Any updates or changes from those notes are reflected in this note.                  "

## 2024-06-27 LAB
PATH REPORT.COMMENTS IMP SPEC: NORMAL
PATH REPORT.COMMENTS IMP SPEC: NORMAL
PATH REPORT.FINAL DX SPEC: NORMAL
PATH REPORT.GROSS SPEC: NORMAL
PATH REPORT.MICROSCOPIC SPEC OTHER STN: NORMAL
PATH REPORT.RELEVANT HX SPEC: NORMAL
PHOTO IMAGE: NORMAL

## 2024-11-06 DIAGNOSIS — R06.00 PND (PAROXYSMAL NOCTURNAL DYSPNEA): ICD-10-CM

## 2024-11-08 RX ORDER — FLUTICASONE PROPIONATE 50 MCG
1 SPRAY, SUSPENSION (ML) NASAL DAILY
Qty: 16 G | Refills: 0 | Status: SHIPPED | OUTPATIENT
Start: 2024-11-08

## 2024-12-14 ENCOUNTER — HEALTH MAINTENANCE LETTER (OUTPATIENT)
Age: 64
End: 2024-12-14

## 2025-06-14 DIAGNOSIS — R06.00 PND (PAROXYSMAL NOCTURNAL DYSPNEA): ICD-10-CM

## 2025-06-16 RX ORDER — FLUTICASONE PROPIONATE 50 MCG
1 SPRAY, SUSPENSION (ML) NASAL DAILY
Qty: 16 G | Refills: 0 | Status: SHIPPED | OUTPATIENT
Start: 2025-06-16

## (undated) DEVICE — GLOVE EXAM NITRILE LG PF LATEX FREE 5064

## (undated) DEVICE — SOL WATER IRRIG 500ML BOTTLE 2F7113

## (undated) DEVICE — ENDO BITE BLOCK ADULT OMNI-BLOC

## (undated) DEVICE — SPECIMEN CONTAINER 3OZ W/FORMALIN 59901

## (undated) DEVICE — INFLATION DEVICE BIG 60 ENDO-AN6012

## (undated) DEVICE — CATH BALLOON MERIT ESOPH FIVE-STAGE 17X21MMX180CM EX18

## (undated) DEVICE — SYR 50ML SLIP TIP W/O NDL 309654

## (undated) DEVICE — ENDO FORCEP BX CAPTURA PRO SPIKE G50696

## (undated) DEVICE — SUCTION MANIFOLD NEPTUNE 2 SYS 1 PORT 702-025-000

## (undated) DEVICE — TUBING SUCTION MEDI-VAC 1/4"X20' N620A

## (undated) DEVICE — KIT ENDO TURNOVER/PROCEDURE CARRY-ON 101822

## (undated) DEVICE — GOWN IMPERVIOUS 2XL BLUE